# Patient Record
Sex: FEMALE | Race: WHITE | URBAN - METROPOLITAN AREA
[De-identification: names, ages, dates, MRNs, and addresses within clinical notes are randomized per-mention and may not be internally consistent; named-entity substitution may affect disease eponyms.]

---

## 2014-08-13 LAB — GLUCOSE SERPL-MCNC: 96 MG/DL (ref 74–100)

## 2017-02-01 ENCOUNTER — OFFICE VISIT (OUTPATIENT)
Dept: FAMILY MEDICINE | Facility: CLINIC | Age: 42
End: 2017-02-01
Payer: COMMERCIAL

## 2017-02-01 VITALS
WEIGHT: 214 LBS | HEIGHT: 64 IN | BODY MASS INDEX: 36.54 KG/M2 | TEMPERATURE: 97.8 F | DIASTOLIC BLOOD PRESSURE: 72 MMHG | HEART RATE: 74 BPM | SYSTOLIC BLOOD PRESSURE: 132 MMHG

## 2017-02-01 DIAGNOSIS — R21 RASH: ICD-10-CM

## 2017-02-01 DIAGNOSIS — M77.8 RIGHT ELBOW TENDINITIS: Primary | ICD-10-CM

## 2017-02-01 DIAGNOSIS — B36.0 TINEA VERSICOLOR: ICD-10-CM

## 2017-02-01 LAB
KOH PREP SPEC: ABNORMAL
MICRO REPORT STATUS: ABNORMAL
SPECIMEN SOURCE: ABNORMAL

## 2017-02-01 PROCEDURE — 99214 OFFICE O/P EST MOD 30 MIN: CPT | Performed by: FAMILY MEDICINE

## 2017-02-01 PROCEDURE — 87210 SMEAR WET MOUNT SALINE/INK: CPT | Performed by: FAMILY MEDICINE

## 2017-02-01 RX ORDER — FLUCONAZOLE 100 MG/1
TABLET ORAL
Qty: 6 TABLET | Refills: 0 | Status: SHIPPED | OUTPATIENT
Start: 2017-02-01 | End: 2017-03-13

## 2017-02-01 NOTE — PROGRESS NOTES
SUBJECTIVE:                                                    Johnna Severson is a 41 year old female who presents to clinic today for the following health issues:      Joint Pain     Onset: ongoing, started working out more, fell on it about one month ago    Description:   Location: elbow, right  Character: sharper when she uses it, if unused its just sore    Intensity: moderate    Progression of Symptoms: worse    Accompanying Signs & Symptoms:  Other symptoms: radiation of pain to hand, numbness   History:   Previous similar pain: no       Precipitating factors:   Trauma or overuse: no     Alleviating factors:  Improved by: nothing       Therapies Tried and outcome: nothing      Diet/ Exercise/ weight loss  Dropped 36 lbs since December    Joined the [employer] - goes 5 days a week    Likes to do yoga, does elliptical bike  Intermittent fasting   Eats breakfast and dinner  On Keto diet    Eating 10 percent carbs    Will get hungry at dinner times and eat a salad      Hands less swollen    Joints less painful    Lost 20lbs in the first 3.5 weeks     Patient hoping to get off progesterone    Having a period a week early     Sleep / Mood  Still having a hard time sleeping, afraid to wake up sick again    Able to fall asleep at 2 am, and wakes up at 9 am       Takes thyroid pill an hour after she gets out of bed  13 mcg  Sustained t3 at 7 pm    Lateral Epicondylitis   Right elbow hurts    Tender    Feels like it is tendonitis    Doing extremely light weights - 3lbs  Body pump class and yoga may have triggered it    Uses tape gun when she's packing things at work - may be trigger    Rash     Onset: a little over one week    Description:   Location: neck, has gone tanning a couple times preparing for a trip  Character: blotchy, raised  Itching (Pruritis): no     Progression of Symptoms:  Same, a little improved    Accompanying Signs & Symptoms:  Fever: no   Body aches or joint pain: no   Sore throat symptoms: no   Recent  cold symptoms: no    History:   Previous similar rash: no     Precipitating factors:   Exposure to similar rash: no   New exposures: None and tanning   Recent travel: no     Alleviating factors:  nothing     Therapies Tried and outcome: lotion     Neck Rash  12 years ago had Tinea Versicolar on upper right back, relieved by oral antifungal in less than a week  Patient has been tanning    Wondering if this new recent rash is related to sun sensitivity or to her keto diet    Traveling in three week to [place] and wants it resolved by then      Problem list and histories reviewed & adjusted, as indicated.  Additional history: as documented    Patient Active Problem List   Diagnosis     Depression, major     Migraine headache     CHEYENNE (generalized anxiety disorder)     CARDIOVASCULAR SCREENING; LDL GOAL LESS THAN 160     Intermittent asthma     Vitamin D deficiency     Elevated LFTs     Hypertension goal BP (blood pressure) < 140/90     Obesity     Encounter for IUD insertion     Zinc deficiency     IBS (irritable bowel syndrome)     Lactose intolerance     Nonallergic rhinitis     Diagnostic skin and sensitization tests     Keratosis pilaris     Other specified hypothyroidism     Fibromyalgia     Migraine with aura and without status migrainosus, not intractable     overweight with BMI >40     Past Surgical History   Procedure Laterality Date     Cryotherapy, cervical  2002       Social History   Substance Use Topics     Smoking status: Former Smoker -- 0.50 packs/day for 10 years     Types: Cigarettes     Quit date: 08/18/2002     Smokeless tobacco: Former User     Alcohol Use: No     Family History   Problem Relation Age of Onset     DIABETES Maternal Grandfather      CANCER Maternal Grandfather      brain     Cardiovascular Maternal Grandmother      carotid stenosis, CHF     Hypertension Maternal Grandmother      Lipids Brother      Allergies Brother      Hypertension Mother      Allergies Mother      Arthritis  Mother      Other - See Comments Mother      Mangioma removed Sept 2015     DIABETES Father      C.A.D. Father      CABG, stents, 52     HEART DISEASE Father      Alcohol/Drug Paternal Grandfather      Thyroid Disease Maternal Aunt      CANCER Maternal Aunt      ov     CANCER       maternal cousin     CANCER Other 41     uterine          Current Outpatient Prescriptions   Medication Sig Dispense Refill     hydrochlorothiazide (HYDRODIURIL) 25 MG tablet Take 1 tablet (25 mg) by mouth as needed 90 tablet 3     SUMAtriptan (IMITREX) 100 MG tablet Take 1 tablet (100 mg) by mouth at onset of headache for migraine May repeat in 2 hours if needed: max 2/day; 9 tablet 11     albuterol (PROAIR HFA/PROVENTIL HFA/VENTOLIN HFA) 108 (90 BASE) MCG/ACT Inhaler Inhale 2 puffs into the lungs every 6 hours as needed for shortness of breath / dyspnea Profile Rx: patient will contact pharmacy when needed 18 g 3     cyclobenzaprine (FLEXERIL) 10 MG tablet Take 1 tablet (10 mg) by mouth 3 times daily as needed for muscle spasms 90 tablet 1     atenolol (TENORMIN) 25 MG tablet Take 1 tablet (25 mg) by mouth daily 90 tablet 3     UNABLE TO FIND MEDICATION NAME: T4 95 MCG/T3 12.9 MCG capsule - take one capsule by mouth in the morning on empty stomach 1 each 0     ALPRAZolam (ALPRAZOLAM XR) 1 MG 24 hr tablet Take 1 tablet (1 mg) by mouth 2 times daily Prescribed by psychiatry 1 tablet 0     Progesterone Micronized (PROGESTERONE PO) Take 200 mg by mouth Take one capsule by mouth at bedtime on days 10-28 of cycle. Stop if period starts.       UNABLE TO FIND MEDICATION NAME: Triiodo-L-Thyroninr (T3) SR 5 mcg capsule - take one capsule by mouth in earlyafternoon       order for DME Magnesium chloride 12.5% solution.  Take 2-4 tablespoons daily.  Mix in juice or water before taking.  Keep in refrigerator. 960 mL 6     cholecalciferol (VITAMIN D3) 5000 UNITS CAPS capsule Take by mouth daily       mometasone (NASONEX) 50 MCG/ACT nasal spray Spray 2  "sprays into both nostrils daily.       Cod Liver Oil 1250-135 UNITS CAPS Take  by mouth.       B Complex Vitamins (B COMPLEX PO) Take  by mouth.       Probiotic Product (PROBIOTIC PO) Take  by mouth.       [DISCONTINUED] ORDER FOR DME Magnesium chloride 12.5% solution.  Take 2-4 tablespoons daily.  Mix in juice or water before taking.  Keep in refrigerator. 480 mL 6     Allergies   Allergen Reactions     Estrogens [Conjugated Estrogens]      Migraine headaches     Milk Protein GI Disturbance     IgE to milk NEGATIVE in 11/12 per [facility]      No Clinical Screening - See Comments      MRI contrast     Gluten Fatigue, Rash and GI Disturbance     5/11 celiac labs NEGATIVE     Problem list, Medication list, Allergies, and Medical/Social/Surgical histories reviewed in [facility] and updated as appropriate.    ROS:  Constitutional, neuro, ENT, endocrine, pulmonary, cardiac, gastrointestinal, genitourinary, musculoskeletal, integument and psychiatric systems are negative, except as otherwise noted.    This document serves as a record of the services and decisions personally performed and made by Fiona Black. It was created on her behalf by Nereyda Whittaker, a trained medical scribe. The creation of this document is based the provider's statements to the medical scribe.  Nereyda Whittaker February 1, 2017 10:10 AM      OBJECTIVE:                                                    /72 mmHg  Pulse 74  Temp(Src) 97.8  F (36.6  C) (Oral)  Ht 5' 4\" (1.626 m)  Wt 214 lb (97.07 kg)  BMI 36.72 kg/m2  LMP 02/01/2017  Body mass index is 36.72 kg/(m^2).  GENERAL APPEARANCE: healthy, alert, no distress and overweight  ELBOWS: Tender over lateral epicondyle right side(s).  Resisted external rotation and extension of the wrist and fingers reproduces pain.  SKIN: Mildly erythematous, hyperpigmented patches at the base of the neck and into hairline, mild scales  PSYCH: mentation appears normal and affect " normal/bright    Diagnostic Test Results:  Results for orders placed or performed in visit on 02/01/17 (from the past 24 hour(s))   KOH prep (Other than skin, nails, hair)   Result Value Ref Range    Specimen Description Skin     KOH Prep Fungal elements seen (A)     Micro Report Status FINAL 02/01/2017           ASSESSMENT/PLAN:                                                      Annita was seen today for derm problem, other, elbow pain and refill request.    Diagnoses and all orders for this visit:    Right elbow tendinitis   - see patient instructions, will start home exercises   - if no improvement can consider physical therapy and/or ortho    Tinea versicolor  -     fluconazole (DIFLUCAN) 100 MG tablet; 3 tablets po once a week for 2 weeks  -  Common side effects of medications prescribed at this visit were discussed with the patient. Severe side effects, including current applicable black box warnings, were discussed. We discussed options for dealing with these possible side effects and allergic reactions, based on their severity.  -  due to it being in the hairline will use oral instead of topical    Rash  -     KOH prep (Other than skin, nails, hair)  -  due to tinea       Follow up with Provider - PRN   Patient Instructions   Best treatment right now for tinea:  Fluconazole 300 mg once a week for 2 weeks.    Keep up with exercises to relieve and resolve tennis elbow      The information in this document, created by the medical scribe, Nereyda Whittaker, for me, accurately reflects the services I personally performed and the decisions made by me. I have reviewed and approved this document for accuracy prior to leaving the patient care area.  Fiona Black, February 1, 2017 10:11 AM      Fiona Black MD  [facility]

## 2017-02-01 NOTE — NURSING NOTE
"Chief Complaint   Patient presents with     Derm Problem     rash on neck for 1 week     Other     due for health maintenance     Elbow Pain     right     Refill Request     she would like refills if needed since she is here, unsure of what she needed       Initial /72 mmHg  Pulse 74  Temp(Src) 97.8  F (36.6  C) (Oral)  Ht 5' 4\" (1.626 m)  Wt 214 lb (97.07 kg)  BMI 36.72 kg/m2  LMP 02/01/2017 Estimated body mass index is 36.72 kg/(m^2) as calculated from the following:    Height as of this encounter: 5' 4\" (1.626 m).    Weight as of this encounter: 214 lb (97.07 kg).  BP completed using cuff size: lev Collier MA      "

## 2017-02-01 NOTE — MR AVS SNAPSHOT
After Visit Summary   2/1/2017    Johnna Severson    MRN: [id number]           Patient Information     Date Of Birth          1975        Visit Information        Provider Department      2/1/2017 10:00 AM Fiona Black MD [facility]        Today's Diagnoses     Right elbow tendinitis    -  1     Tinea versicolor         Rash           Care Instructions    Best treatment right now for tinea:  Fluconazole 300 mg once a week for 2 weeks.    Keep up with exercises to relieve and resolve tennis elbow                 Lateral Epicondylitis (Tennis Elbow)   Rehabilitation Exercises   You may do the stretching exercises right away. You may do the strengthening exercises when stretching is nearly painless.   Stretching exercises     Wrist range of motion: Bend your wrist forward and backward as far as you can. Do 3 sets of 10.     Pronation and supination of the forearm: With your elbow bent 90 , turn your palm upward and hold for 5 seconds. Slowly turn your palm downward and hold for 5 seconds. Make sure you keep your elbow at your side and bent 90  throughout this exercise. Do 3 sets of 10.     Elbow range of motion: Gently bring your palm up toward your shoulder and bend your elbow as far as you can. Then straighten your elbow as far as you can 10 times. Do 3 sets of 10.   Strengthening exercises     Wrist flexion exercise: Hold a can or hammer handle in your hand with your palm facing up. Bend your wrist upward. Slowly lower the weight and return to the starting position. Do 3 sets of 10. Gradually increase the weight of the can or weight you are holding.     Wrist extension exercise: Hold a soup can or hammer handle in your hand with your palm facing down. Slowly bend your wrist upward. Slowly lower the weight down into the starting position. Do 3 sets of 10. Gradually increase the weight of the object you are holding.     Wrist radial deviation strengthening: Put your wrist  in the sideways position with your thumb up. Hold a can of soup or a hammer handle and gently bend your wrist up, with the thumb reaching toward the ceiling. Slowly lower to the starting position. Do not move your forearm throughout this exercise. Do 3 sets of 10.     Forearm pronation and supination strengthening: Hold a soup can or hammer handle in your hand and bend your elbow 90 . Slowly rotate your hand with your palm upward and then palm down. Do 3 sets of 10.     Wrist extension (with broom handle): Stand up and hold a broom handle in both hands. With your arms at shoulder level, elbows straight and palms down, roll the broom handle backward in your hand as if you are reeling something in using a broom handle. Do 3 sets of 10.   Written by Lidia Johnson, MS, PT, for [employer]   Published by [employer]   This content is reviewed periodically and is subject to change as new health information becomes available. The information is intended to inform and educate and is not a replacement for medical evaluation, advice, diagnosis or treatment by a healthcare professional.   Sports Medicine Advisor 2003.1 Index  Sports Medicine Advisor 2003.1 Credits   Copyright   2003 [employer] All rights reserved.                    Follow-ups after your visit        Who to contact     If you have questions or need follow up information about today's clinic visit or your schedule please contact [facility] directly at [phone].  Normal or non-critical lab and imaging results will be communicated to you by MyChart, letter or phone within 4 business days after the clinic has received the results. If you do not hear from us within 7 days, please contact the clinic through MyChart or phone. If you have a critical or abnormal lab result, we will notify you by phone as soon as possible.  Submit refill requests through [employer] or call your pharmacy and they  "will forward the refill request to us. Please allow 3 business days for your refill to be completed.          Additional Information About Your Visit        [employer] Information     [email or web address] gives you secure access to your electronic health record. If you see a primary care provider, you can also send messages to your care team and make appointments. If you have questions, please call your primary care clinic.  If you do not have a primary care provider, please call [phone] and they will assist you.        Care EveryWhere ID     This is your Care EveryWhere ID. This could be used by other organizations to access your [place] medical records  [id number]        Your Vitals Were     Pulse Temperature Height BMI (Body Mass Index) Last Period       74 97.8  F (36.6  C) (Oral) 5' 4\" (1.626 m) 36.72 kg/m2 02/01/2017        Blood Pressure from Last 3 Encounters:   02/01/17 132/72   12/16/16 130/70   11/03/16 146/84    Weight from Last 3 Encounters:   02/01/17 214 lb (97.07 kg)   12/16/16 233 lb 6.4 oz (105.87 kg)   11/03/16 241 lb 12.8 oz (109.68 kg)              We Performed the Following     KOH prep (Other than skin, nails, hair)          Today's Medication Changes          These changes are accurate as of: 2/1/17 11:03 AM.  If you have any questions, ask your nurse or doctor.               Start taking these medicines.        Dose/Directions    fluconazole 100 MG tablet   Commonly known as:  DIFLUCAN   Used for:  Tinea versicolor   Started by:  Fiona Black MD        3 tablets po once a week for 2 weeks   Quantity:  6 tablet   Refills:  0            Where to get your medicines      These medications were sent to [id number], [address] [place] [address] [facility] [address], [facility] [address]     Phone:  [phone]    - fluconazole 100 MG tablet             Primary Care Provider Office Phone # Fax #    Fiona Black [phone] "       [facility] [address] [facility] [address]        Thank you!     Thank you for choosing [facility]  for your care. Our goal is always to provide you with excellent care. Hearing back from our patients is one way we can continue to improve our services. Please take a few minutes to complete the written survey that you may receive in the mail after your visit with us. Thank you!             Your Updated Medication List - Protect others around you: Learn how to safely use, store and throw away your medicines at www.disposemymeds.org.          This list is accurate as of: 2/1/17 11:03 AM.  Always use your most recent med list.                   Brand Name Dispense Instructions for use    albuterol 108 (90 BASE) MCG/ACT Inhaler    PROAIR HFA/PROVENTIL HFA/VENTOLIN HFA    18 g    Inhale 2 puffs into the lungs every 6 hours as needed for shortness of breath / dyspnea Profile Rx: patient will contact pharmacy when needed       ALPRAZOLAM XR 1 MG 24 hr tablet   Generic drug:  ALPRAZolam     1 tablet    Take 1 tablet (1 mg) by mouth 2 times daily Prescribed by psychiatry       atenolol 25 MG tablet    TENORMIN    90 tablet    Take 1 tablet (25 mg) by mouth daily       B COMPLEX PO      Take  by mouth.       cholecalciferol 5000 UNITS Caps capsule    vitamin D3     Take by mouth daily       Cod Liver Oil 1250-135 UNITS Caps      Take  by mouth.       cyclobenzaprine 10 MG tablet    FLEXERIL    90 tablet    Take 1 tablet (10 mg) by mouth 3 times daily as needed for muscle spasms       fluconazole 100 MG tablet    DIFLUCAN    6 tablet    3 tablets po once a week for 2 weeks       hydrochlorothiazide 25 MG tablet    HYDRODIURIL    90 tablet    Take 1 tablet (25 mg) by mouth as needed       NASONEX 50 MCG/ACT spray   Generic drug:  mometasone      Spray 2 sprays into both nostrils daily.       order for DME     960 mL    Magnesium chloride 12.5% solution.  Take 2-4 tablespoons  daily.  Mix in juice or water before taking.  Keep in refrigerator.       PROBIOTIC PO      Take  by mouth.       PROGESTERONE PO      Take 200 mg by mouth Take one capsule by mouth at bedtime on days 10-28 of cycle. Stop if period starts.       SUMAtriptan 100 MG tablet    IMITREX    9 tablet    Take 1 tablet (100 mg) by mouth at onset of headache for migraine May repeat in 2 hours if needed: max 2/day;       * UNABLE TO FIND      MEDICATION NAME: Triiodo-L-Thyroninr (T3) SR 5 mcg capsule - take one capsule by mouth in earlyafternoon       * UNABLE TO FIND     1 each    MEDICATION NAME: T4 95 MCG/T3 12.9 MCG capsule - take one capsule by mouth in the morning on empty stomach       * Notice:  This list has 2 medication(s) that are the same as other medications prescribed for you. Read the directions carefully, and ask your doctor or other care provider to review them with you.

## 2017-02-01 NOTE — PATIENT INSTRUCTIONS
Best treatment right now for tinea:  Fluconazole 300 mg once a week for 2 weeks.    Keep up with exercises to relieve and resolve tennis elbow                 Lateral Epicondylitis (Tennis Elbow)   Rehabilitation Exercises   You may do the stretching exercises right away. You may do the strengthening exercises when stretching is nearly painless.   Stretching exercises     Wrist range of motion: Bend your wrist forward and backward as far as you can. Do 3 sets of 10.     Pronation and supination of the forearm: With your elbow bent 90 , turn your palm upward and hold for 5 seconds. Slowly turn your palm downward and hold for 5 seconds. Make sure you keep your elbow at your side and bent 90  throughout this exercise. Do 3 sets of 10.     Elbow range of motion: Gently bring your palm up toward your shoulder and bend your elbow as far as you can. Then straighten your elbow as far as you can 10 times. Do 3 sets of 10.   Strengthening exercises     Wrist flexion exercise: Hold a can or hammer handle in your hand with your palm facing up. Bend your wrist upward. Slowly lower the weight and return to the starting position. Do 3 sets of 10. Gradually increase the weight of the can or weight you are holding.     Wrist extension exercise: Hold a soup can or hammer handle in your hand with your palm facing down. Slowly bend your wrist upward. Slowly lower the weight down into the starting position. Do 3 sets of 10. Gradually increase the weight of the object you are holding.     Wrist radial deviation strengthening: Put your wrist in the sideways position with your thumb up. Hold a can of soup or a hammer handle and gently bend your wrist up, with the thumb reaching toward the ceiling. Slowly lower to the starting position. Do not move your forearm throughout this exercise. Do 3 sets of 10.     Forearm pronation and supination strengthening: Hold a soup can or hammer handle in your hand and bend your elbow 90 . Slowly rotate  your hand with your palm upward and then palm down. Do 3 sets of 10.     Wrist extension (with broom handle): Stand up and hold a broom handle in both hands. With your arms at shoulder level, elbows straight and palms down, roll the broom handle backward in your hand as if you are reeling something in using a broom handle. Do 3 sets of 10.   Written by Lidia Johnson, MS, PT, for [employer].   Published by [employer].   This content is reviewed periodically and is subject to change as new health information becomes available. The information is intended to inform and educate and is not a replacement for medical evaluation, advice, diagnosis or treatment by a healthcare professional.   Sports Medicine Advisor 2003.1 Index  Sports Medicine Advisor 2003.1 Credits   Copyright   2003 [employer]. All rights reserved.

## 2017-02-02 ASSESSMENT — ASTHMA QUESTIONNAIRES: ACT_TOTALSCORE: 22

## 2017-03-13 ENCOUNTER — OFFICE VISIT (OUTPATIENT)
Dept: FAMILY MEDICINE | Facility: CLINIC | Age: 42
End: 2017-03-13
Payer: COMMERCIAL

## 2017-03-13 VITALS
BODY MASS INDEX: 34.17 KG/M2 | HEIGHT: 64 IN | TEMPERATURE: 97.5 F | WEIGHT: 200.13 LBS | HEART RATE: 66 BPM | SYSTOLIC BLOOD PRESSURE: 124 MMHG | DIASTOLIC BLOOD PRESSURE: 74 MMHG

## 2017-03-13 DIAGNOSIS — M77.11 LATERAL EPICONDYLITIS OF RIGHT ELBOW: Primary | ICD-10-CM

## 2017-03-13 DIAGNOSIS — J45.20 INTERMITTENT ASTHMA, UNCOMPLICATED: ICD-10-CM

## 2017-03-13 PROCEDURE — 99213 OFFICE O/P EST LOW 20 MIN: CPT | Performed by: PHYSICIAN ASSISTANT

## 2017-03-13 ASSESSMENT — ANXIETY QUESTIONNAIRES
6. BECOMING EASILY ANNOYED OR IRRITABLE: NOT AT ALL
5. BEING SO RESTLESS THAT IT IS HARD TO SIT STILL: NOT AT ALL
2. NOT BEING ABLE TO STOP OR CONTROL WORRYING: SEVERAL DAYS
GAD7 TOTAL SCORE: 2
3. WORRYING TOO MUCH ABOUT DIFFERENT THINGS: SEVERAL DAYS
IF YOU CHECKED OFF ANY PROBLEMS ON THIS QUESTIONNAIRE, HOW DIFFICULT HAVE THESE PROBLEMS MADE IT FOR YOU TO DO YOUR WORK, TAKE CARE OF THINGS AT HOME, OR GET ALONG WITH OTHER PEOPLE: NOT DIFFICULT AT ALL
1. FEELING NERVOUS, ANXIOUS, OR ON EDGE: NOT AT ALL
7. FEELING AFRAID AS IF SOMETHING AWFUL MIGHT HAPPEN: NOT AT ALL

## 2017-03-13 ASSESSMENT — PATIENT HEALTH QUESTIONNAIRE - PHQ9: 5. POOR APPETITE OR OVEREATING: NOT AT ALL

## 2017-03-13 NOTE — PATIENT INSTRUCTIONS
[facility]   Discharged by : Reena YE MA    If you have any questions regarding your visit please contact your care team:     Team Gold Clinic Hours Telephone Number   Dr. Gemini Hodge PA-C   7am-7pm Monday - Thursday   7am-5pm Fridays  [phone]   (Appointment scheduling available 24/7)   RN Line   [phone] option 2       For a Price Quote for your services, please call our Consumer Price Line at [phone].     What options do I have for visits at the clinic other than the traditional office visit?     To expand how we care for you, many of our providers are utilizing electronic visits (e-visits) and telephone visits, when medically appropriate, for interactions with their patients rather than a visit in the clinic. We also offer nurse visits for many medical concerns. Just like any other service, we will bill your insurance company for this type of visit based on time spent on the phone with your provider. Not all insurance companies cover these visits. Please check with your medical insurance if this type of visit is covered. You will be responsible for any charges that are not paid by your insurance.   E-visits via LocalBonus: generally incur a $35.00 fee.     Telephone visits:   Time spent on the phone: *charged based on time that is spent on the phone in increments of 10 minutes. Estimated cost:   5-10 mins $30.00   11-20 mins. $59.00   21-30 mins. $85.00     Use FRAMEDhart (secure email communication and access to your chart) to send your primary care provider a message or make an appointment. Ask someone on your Team how to sign up for [employer].     As always, Thank you for trusting us with your health care needs!      [facility]:    Scheduling Appointments  [facility] [place]  Call: [phone]    [facility]abel Elkins [facility]  Call: [phone]    [facility]  Locations  Call: [phone]      WHERE TO GO FOR CARE?    Clinic    Make an appointment if you:       Are sick (cold, cough, flu, sore throat, earache or in pain).       Have a small injury (sprain, small cut, burn or broken bone).       Need a physical exam, Pap smear, vaccine or prescription refill.       Have questions about your health or medicines.    To reach us:      Call [phone] ([phone]). Open 24 hours every day. (For counseling services, call [phone].)    Log into [employer] at [email or web address][employer]. (Visit ReplyBuy to create an account.) Hospital emergency room    An emergency is a serious or life- threatening problem that must be treated right away.    Call [phone] or get to the hospital if you have:      Very bad or sudden:            - Chest pain or pressure         - Bleeding         - Head or belly pain         - Dizziness or trouble seeing, walking or                          Speaking      Problems breathing      Blood in your vomit or you are coughing up blood      A major injury (knocked out, loss of a finger or limb, rape, broken bone protruding from skin)    A mental health crisis. (Or call the Mental Health Crisis line at 1-179.560.9713 or Suicide Prevention Hotline at 1-526.491.8024.)    Open 24 hours every day. You don't need an appointment.     Urgent care    Visit urgent care for sickness or small injuries when the clinic is closed. You don't need an appointment. To check hours or find an urgent care near you, visit [email or web address]. Online care    Get online care from [employer] for more than 70 common problems, like colds, allergies and infections. Open 24 hours every day at: [email or web address]   Need help deciding?    For advice about where to be seen, you may call your clinic and ask to speak with a nurse. We're here for you 24 hours every day.         If you are deaf or hard of hearing, please let us know. We provide many free services including  sign language interpreters, oral interpreters, TTYs, telephone amplifiers, note takers and written materials.

## 2017-03-13 NOTE — NURSING NOTE
"Chief Complaint   Patient presents with     Musculoskeletal Problem     tennis elbow       Initial /74 (BP Location: Right arm, Cuff Size: Adult Regular)  Pulse 66  Temp 97.5  F (36.4  C) (Oral)  Ht 5' 4\" (1.626 m)  Wt 200 lb 2 oz (90.8 kg)  BMI 34.35 kg/m2 Estimated body mass index is 34.35 kg/(m^2) as calculated from the following:    Height as of this encounter: 5' 4\" (1.626 m).    Weight as of this encounter: 200 lb 2 oz (90.8 kg).  Medication Reconciliation: complete     Gemini MEDINA, Certified Medical Assistant (AAMA)March 13, 2017 2:56 PM      "

## 2017-03-13 NOTE — PROGRESS NOTES
"  SUBJECTIVE:                                                    Johnna Severson is a 41 year old female who presents to clinic today for the following health issues:        Asthma Follow-Up    Was ACT completed today?    Yes    ACT Total Scores 3/13/2017   ACT TOTAL SCORE (Goal Greater than or Equal to 20) 25   In the past 12 months, how many times did you visit the emergency room for your asthma without being admitted to the hospital? 0   In the past 12 months, how many times were you hospitalized overnight because of your asthma? 0       Recent asthma triggers that patient is dealing with: pollens        Amount of exercise or physical activity: 5 days/week for an average of greater than 60 minutes    Problems taking medications regularly: No    Medication side effects: none  Diet: Wade diet    Joint Pain - Right elbow     Onset: 1 month    Description:   Location: right elbow  Character: feels like tendonitis    Intensity: moderate    Progression of Symptoms: worse    Accompanying Signs & Symptoms:  Other symptoms: radiation of pain down right arm, tingling and weakness of right arm   History:   Previous similar pain: no       Precipitating factors:   Trauma or overuse: no     Alleviating factors:  Improved by: ice numbs it a little       Therapies Tried and outcome: PT at home and has brace but not helping anymore.        -------------------------------------    Problem list, Medication list, Allergies, and Medical/Social/Surgical histories reviewed in [facility] and updated as appropriate.    ROS:  A pertinent ROS of the General  Cardiovascular  Pulmonary  Musculoskeletal   Neurological  Integumentary systems is otherwise unremarkable.      OBJECTIVE:                                                    /74 (BP Location: Right arm, Cuff Size: Adult Regular)  Pulse 66  Temp 97.5  F (36.4  C) (Oral)  Ht 5' 4\" (1.626 m)  Wt 200 lb 2 oz (90.8 kg)  BMI 34.35 kg/m2 .  GENERAL: healthy, alert and no distress  Elbow " Exam: Inspection: swelling lateral epicondyle  Tender: lateral epicondyle and common extensor tendon  Non-tender: medial epicondyle, common flexor tendon, flexor muscle of forearm and olecranon bursa  Range of Motion: flexion: normal, extension: normal, supination:  Normal with pain, pronation: normal  Strength: flexion: 5/5, extension: 5/5, supination: 4+/5, painful and pronation: 5/5  Special tests: pain with resisted supination:       Diagnostic test results:  Diagnostic Test Results:  none      ASSESSMENT/PLAN:                                                        1. Lateral epicondylitis of right elbow  Discussed possible injection with ortho. Referral given.   - ORTHO  REFERRAL    2. Intermittent asthma, uncomplicated  Stable. No concerns.          Ayde Hodge PA-C  [employer]     Chart reviewed.  Encounter was not reviewed with provider.  Patient was not examined by me.  Fiona Black MD

## 2017-03-13 NOTE — MR AVS SNAPSHOT
After Visit Summary   3/13/2017    Johnna Severson    MRN: [id number]           Patient Information     Date Of Birth          1975        Visit Information        Provider Department      3/13/2017 2:40 PM Ayde Hodge PA-C [facility]        Today's Diagnoses     Lateral epicondylitis of right elbow    -  1      Care Instructions    [facility]   Discharged by : Reena YE MA    If you have any questions regarding your visit please contact your care team:     Team Gold Clinic Hours Telephone Number   Dr. Gemini Hodge PA-C   7am-7pm Monday - Thursday   7am-5pm Fridays  [phone]   (Appointment scheduling available 24/7)   RN Line   [phone] option 2       For a Price Quote for your services, please call our Consumer Price Line at [phone].     What options do I have for visits at the clinic other than the traditional office visit?     To expand how we care for you, many of our providers are utilizing electronic visits (e-visits) and telephone visits, when medically appropriate, for interactions with their patients rather than a visit in the clinic. We also offer nurse visits for many medical concerns. Just like any other service, we will bill your insurance company for this type of visit based on time spent on the phone with your provider. Not all insurance companies cover these visits. Please check with your medical insurance if this type of visit is covered. You will be responsible for any charges that are not paid by your insurance.   E-visits via BigML: generally incur a $35.00 fee.     Telephone visits:   Time spent on the phone: *charged based on time that is spent on the phone in increments of 10 minutes. Estimated cost:   5-10 mins $30.00   11-20 mins. $59.00   21-30 mins. $85.00     Use BigML (secure email communication and access to your chart) to send your primary care  provider a message or make an appointment. Ask someone on your Team how to sign up for Szl.     As always, Thank you for trusting us with your health care needs!      [facility]:    Scheduling Appointments  [facility]  Call: [phone]    [facility]  Call: [phone]    [facility]  Call: [phone]      WHERE TO GO FOR CARE?    Clinic    Make an appointment if you:       Are sick (cold, cough, flu, sore throat, earache or in pain).       Have a small injury (sprain, small cut, burn or broken bone).       Need a physical exam, Pap smear, vaccine or prescription refill.       Have questions about your health or medicines.    To reach us:      Call [phone] ([phone]). Open 24 hours every day. (For counseling services, call [phone].)    Log into Szl at [employer]. (Visit [employer].Pending sale to [facility] to create an account.) Hospital emergency room    An emergency is a serious or life- threatening problem that must be treated right away.    Call 173 or get to the hospital if you have:      Very bad or sudden:            - Chest pain or pressure         - Bleeding         - Head or belly pain         - Dizziness or trouble seeing, walking or                          Speaking      Problems breathing      Blood in your vomit or you are coughing up blood      A major injury (knocked out, loss of a finger or limb, rape, broken bone protruding from skin)    A mental health crisis. (Or call the Mental Health Crisis line at 1-907.442.4649 or Suicide Prevention Hotline at 1-581.404.3671.)    Open 24 hours every day. You don't need an appointment.     Urgent care    Visit urgent care for sickness or small injuries when the clinic is closed. You don't need an appointment. To check hours or find an urgent care near you, visit www.Pending sale to [email or web address]. Online care    Get online care from [facility] for more  than 70 common problems, like colds, allergies and infections. Open 24 hours every day at: [email or web address]   Need help deciding?    For advice about where to be seen, you may call your clinic and ask to speak with a nurse. We're here for you 24 hours every day.         If you are deaf or hard of hearing, please let us know. We provide many free services including sign language interpreters, oral interpreters, TTYs, telephone amplifiers, note takers and written materials.                       Follow-ups after your visit        Additional Services     ORTHO  REFERRAL       [facility] is referring you to the Orthopedic  Services at [facility].       The  Representative will assist you in the coordination of your Orthopedic and Musculoskeletal Care as prescribed by your physician.    The  Representative will call you within 1 business day to help schedule your appointment, or you may contact the  Representative at:    All areas ~ [phone]     Type of Referral : Non Surgical       Timeframe requested: 1 - 2 days    Coverage of these services is subject to the terms and limitations of your health insurance plan.  Please call member services at your health plan with any benefit or coverage questions.      If X-rays, CT or MRI's have been performed, please contact the facility where they were done to arrange for , prior to your scheduled appointment.  Please bring this referral request to your appointment and present it to your specialist.                  Who to contact     If you have questions or need follow up information about today's clinic visit or your schedule please contact [facility] directly at [phone].  Normal or non-critical lab and imaging results will be communicated to you by MyChart, letter or phone within 4 business days after the clinic has received the results. If you do  "not hear from us within 7 days, please contact the clinic through [employer] (formerly [employer]) or phone. If you have a critical or abnormal lab result, we will notify you by phone as soon as possible.  Submit refill requests through [employer] ([employer]) or call your pharmacy and they will forward the refill request to us. Please allow 3 business days for your refill to be completed.          Additional Information About Your Visit        [employer] Information     [employer] ([employer]) gives you secure access to your electronic health record. If you see a primary care provider, you can also send messages to your care team and make appointments. If you have questions, please call your primary care clinic.  If you do not have a primary care provider, please call [phone] and they will assist you.        Care EveryWhere ID     This is your Care EveryWhere ID. This could be used by other organizations to access your [place] medical records  [id number]        Your Vitals Were     Pulse Temperature Height BMI (Body Mass Index)          66 97.5  F (36.4  C) (Oral) 5' 4\" (1.626 m) 34.35 kg/m2         Blood Pressure from Last 3 Encounters:   03/13/17 124/74   02/01/17 132/72   12/16/16 130/70    Weight from Last 3 Encounters:   03/13/17 200 lb 2 oz (90.8 kg)   02/01/17 214 lb (97.1 kg)   12/16/16 233 lb 6.4 oz (105.9 kg)              We Performed the Following     ORTHO  REFERRAL        Primary Care Provider Office Phone # Fax #    Fiona Black [phone]       [address] [address]        Thank you!     Thank you for choosing [facility]  for your care. Our goal is always to provide you with excellent care. Hearing back from our patients is one way we can continue to improve our services. Please take a few minutes to complete the written survey that you may receive in the mail after your visit with us. Thank you!             Your Updated Medication List - Protect " others around you: Learn how to safely use, store and throw away your medicines at www.disposemymeds.org.          This list is accurate as of: 3/13/17  3:31 PM.  Always use your most recent med list.                   Brand Name Dispense Instructions for use    albuterol 108 (90 BASE) MCG/ACT Inhaler    PROAIR HFA/PROVENTIL HFA/VENTOLIN HFA    18 g    Inhale 2 puffs into the lungs every 6 hours as needed for shortness of breath / dyspnea Profile Rx: patient will contact pharmacy when needed       ALPRAZOLAM XR 1 MG 24 hr tablet   Generic drug:  ALPRAZolam     1 tablet    Take 1 tablet (1 mg) by mouth 2 times daily Prescribed by psychiatry       atenolol 25 MG tablet    TENORMIN    90 tablet    Take 1 tablet (25 mg) by mouth daily       B COMPLEX PO      Take  by mouth.       cholecalciferol 5000 UNITS Caps capsule    vitamin D3     Take by mouth daily       Cod Liver Oil 1250-135 UNITS Caps      Take  by mouth.       cyclobenzaprine 10 MG tablet    FLEXERIL    90 tablet    Take 1 tablet (10 mg) by mouth 3 times daily as needed for muscle spasms       hydrochlorothiazide 25 MG tablet    HYDRODIURIL    90 tablet    Take 1 tablet (25 mg) by mouth as needed       NASONEX 50 MCG/ACT spray   Generic drug:  mometasone      Spray 2 sprays into both nostrils daily.       order for DME     960 mL    Magnesium chloride 12.5% solution.  Take 2-4 tablespoons daily.  Mix in juice or water before taking.  Keep in refrigerator.       PROBIOTIC PO      Take  by mouth.       PROGESTERONE PO      Take 200 mg by mouth Take one capsule by mouth at bedtime on days 10-28 of cycle. Stop if period starts.       SUMAtriptan 100 MG tablet    IMITREX    9 tablet    Take 1 tablet (100 mg) by mouth at onset of headache for migraine May repeat in 2 hours if needed: max 2/day;       * UNABLE TO FIND      MEDICATION NAME: Triiodo-L-Thyroninr (T3) SR 5 mcg capsule - take one capsule by mouth in earlyafternoon       * UNABLE TO FIND     1 each     MEDICATION NAME: T4 95 MCG/T3 12.9 MCG capsule - take one capsule by mouth in the morning on empty stomach       * Notice:  This list has 2 medication(s) that are the same as other medications prescribed for you. Read the directions carefully, and ask your doctor or other care provider to review them with you.

## 2017-03-14 ENCOUNTER — OFFICE VISIT (OUTPATIENT)
Dept: ORTHOPEDICS | Facility: CLINIC | Age: 42
End: 2017-03-14
Payer: COMMERCIAL

## 2017-03-14 VITALS
SYSTOLIC BLOOD PRESSURE: 126 MMHG | DIASTOLIC BLOOD PRESSURE: 72 MMHG | BODY MASS INDEX: 34.15 KG/M2 | HEIGHT: 64 IN | WEIGHT: 200 LBS

## 2017-03-14 DIAGNOSIS — M77.11 RIGHT LATERAL EPICONDYLITIS: Primary | ICD-10-CM

## 2017-03-14 PROCEDURE — 99243 OFF/OP CNSLTJ NEW/EST LOW 30: CPT | Performed by: PEDIATRICS

## 2017-03-14 ASSESSMENT — ANXIETY QUESTIONNAIRES: GAD7 TOTAL SCORE: 2

## 2017-03-14 ASSESSMENT — ASTHMA QUESTIONNAIRES: ACT_TOTALSCORE: 25

## 2017-03-14 ASSESSMENT — PATIENT HEALTH QUESTIONNAIRE - PHQ9: SUM OF ALL RESPONSES TO PHQ QUESTIONS 1-9: 0

## 2017-03-14 NOTE — PROGRESS NOTES
Sports Medicine Clinic Visit    PCP: Fiona Black    Johnna Severson is a 41 year old female who is seen  in consultation at the request of Ayde Hodge PA-C presenting with right lateral elbow pain. Pain has been present for about 2 months.  No specific injury, but did increase her work outs and added yoga.    Has done some HEP stretching, icing and limited use.   Patient is right hand dominant.    Injury: gradual onset.  + pain at rest, which is typical for past few weeks.  Has tried strap, though was wearing it more over the upper arm and elbow itself. Did not really wear over the proximal forearm.    Uses industrial stapler, tape gun routinely with work.    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    Past Medical History   Diagnosis Date     Benign heart murmur 2006     Chronic fatigue      Depression, major      Diagnostic skin and sensitization tests 6/12/13 IgE tests all NEGATIVE for pollens, molds, dust mites, and pets.      Fibroids 8/7/09     CHEYENNE (generalized anxiety disorder)      Psychiatry, Dr. Navarro     Hypertension      Hypothyroid      hashimotos     IBS (irritable bowel syndrome)      5/11 celiac labs NEGATIVE     Intermittent asthma      cold weather     Keratosis pilaris  "     Lactose intolerance      LGSIL (low grade squamous intraepithelial dysplasia) 2002     Migraine headaches      menstrual trigger, aggravated by OCPs     Nonallergic rhinitis      6/12/13 IgE tests all NEGATIVE for pollens, molds, dust mites, and pets.      Panic attacks      PVC's (premature ventricular contractions) 2006     Vitamin D deficiency      Past Surgical History   Procedure Laterality Date     Cryotherapy, cervical  2002     Family History   Problem Relation Age of Onset     DIABETES Maternal Grandfather      CANCER Maternal Grandfather      brain     Cardiovascular Maternal Grandmother      carotid stenosis, CHF     Hypertension Maternal Grandmother      Lipids Brother      Allergies Brother      Hypertension Mother      Allergies Mother      Arthritis Mother      Other - See Comments Mother      Mangioma removed Sept 2015     DIABETES Father      C.A.D. Father      CABG, stents, 52     HEART DISEASE Father      Alcohol/Drug Paternal Grandfather      Thyroid Disease Maternal Aunt      CANCER Other 41     uterine      CANCER Maternal Aunt      ov     CANCER Other      maternal cousin     Social History     Social History     Marital status: Significant other     Spouse name: Arnold     Number of children: 2     Years of education: 12     Occupational History     [employer], admin [employer]     Social History Main Topics     Smoking status: Former Smoker     Packs/day: 0.50     Years: 10.00     Types: Cigarettes     Quit date: 8/18/2002     Smokeless tobacco: Former User     Alcohol use No     Drug use: No     Sexual activity: Yes     Partners: Male     Birth control/ protection: Condom     Other Topics Concern     Parent/Sibling W/ Cabg, Mi Or Angioplasty Before 65f 55m? No     Social History Narrative       Objective  /72  Ht 5' 4\" (1.626 m)  Wt 200 lb (90.7 kg)  BMI 34.33 kg/m2    GENERAL APPEARANCE: healthy, alert and no distress   GAIT: NORMAL  SKIN: no suspicious lesions or " rashes  NEURO: Normal strength and tone, mentation intact and speech normal  PSYCH:  mentation appears normal and affect normal/bright  HEENT: no scleral icterus  CV: no extremity edema  RESP: nonlabored breathing    Right Elbow exam:    Inspection:       no ecchymosis       no edema or effusion    ROM:       full with flexion, extension, forearm supination and pronation.  Pain with end of extension, forearm rotation.    Strength:       flexion 5/5       extension 4/5 increase in pain       forearm supination 4/5, pain       forearm pronation 5-/5 increase in pain   strength reduced on right, particularly with elbow in an extended position    Tender:       lateral epicondyle, extensor tendon, dorsal forearm wad    Non-Tender:       remainder of elbow area    Sensation:  Intact throughout forearm, hand     Skin:       well perfused       capillary refill brisk    + long finger resisted extension test.         Radiology:  None today.    Assessment:  1. Right lateral epicondylitis        Plan:  Discussed the assessment with the patient. Discussed the etiology of lateral epicondyle tendinosis. This condition is frequently caused by repetitive stress from gripping/grasping activities. Cause of this issue may include a change in level of activity that seems trivial to the patient. Treatment options were discussed with the patient stressing the importance of activity modification to avoid painful activities; in particular, limit palm down lifting and reaching. We discussed rehabilitation to improve the pain and overall function at the elbow; referred to therapy next step. Use of device such as counter force bracing and/or wrist bracing was discussed; she applied forearm strap properly today and noted significant benefit, will continue with its use for activities. Also discussed potential benefit from wrist bracing, hold with that for now. Bracing only temporarily helps the symptoms.  Discussed potential for steroid  injection as well. Injection is not curative, and I would also recommend still doing therapy. Additionally, she has had a reaction with palpitations when receiving local anesthetic injection, so this would be limited with a steroid injection if done. At this time, she will forgo an injection in favor of starting therapy. We may reconsider pending her course.  Follow up: 4 weeks if not improving with above, sooner if needed.  Questions answered. The patient indicates understanding of these issues and agrees with the plan.    Yinka Jimenez, DO, CAQ    CC: Ayde Hodge PA-C          Disclaimer: This note consists of symbols derived from keyboarding, dictation and/or voice recognition software. As a result, there may be errors in the script that have gone undetected. Please consider this when interpreting information found in this chart.

## 2017-03-14 NOTE — MR AVS SNAPSHOT
After Visit Summary   3/14/2017    Johnna Severson    MRN: [id number]           Patient Information     Date Of Birth          1975        Visit Information        Provider Department      3/14/2017 6:00 PM Yinka Jimenez,  [facility]        Today's Diagnoses     Right lateral epicondylitis    -  1       Follow-ups after your visit        Additional Services     [facility] PT, HAND, AND CHIROPRACTIC REFERRAL       **This order will print in the [facility] Scheduling Office**    Physical Therapy, Hand Therapy and Chiropractic Care are available through:    *[facility]  *[facility]  *[facility]    Call one number to schedule at any of the above locations: [phone].    Your provider has referred you to: Physical Therapy at [facility] or [facility]/Hand therapy    Indication/Reason for Referral: Elbow Pain  Onset of Illness:   Therapy Orders: Evaluate and Treat  Special Programs: None  Special Request: None    Mari Guerra      Additional Comments for the Therapist or Chiropractor:     Please be aware that coverage of these services is subject to the terms and limitations of your health insurance plan.  Call member services at your health plan with any benefit or coverage questions.      Please bring the following to your appointment:    *Your personal calendar for scheduling future appointments  *Comfortable clothing                  Who to contact     If you have questions or need follow up information about today's clinic visit or your schedule please contact [facility] [place] directly at [phone].  Normal or non-critical lab and imaging results will be communicated to you by MyChart, letter or phone within 4 business days after the clinic has received the results. If you do not hear from us within 7 days, please contact the clinic through MyChart or phone. If you have a critical or abnormal  "lab result, we will notify you by phone as soon as possible.  Submit refill requests through [employer] or call your pharmacy and they will forward the refill request to us. Please allow 3 business days for your refill to be completed.          Additional Information About Your Visit        [employer] Information     [employer] gives you secure access to your electronic health record. If you see a primary care provider, you can also send messages to your care team and make appointments. If you have questions, please call your primary care clinic.  If you do not have a primary care provider, please call [phone] and they will assist you.        Care EveryWhere ID     This is your Care EveryWhere ID. This could be used by other organizations to access your [place] medical records  [id number]        Your Vitals Were     Height BMI (Body Mass Index)                5' 4\" (1.626 m) 34.33 kg/m2           Blood Pressure from Last 3 Encounters:   03/14/17 126/72   03/13/17 124/74   02/01/17 132/72    Weight from Last 3 Encounters:   03/14/17 200 lb (90.7 kg)   03/13/17 200 lb 2 oz (90.8 kg)   02/01/17 214 lb (97.1 kg)              We Performed the Following     [facility] PT, HAND, AND CHIROPRACTIC REFERRAL        Primary Care Provider Office Phone # Fax #    Fiona Black [phone]       [address] [address]        Thank you!     Thank you for choosing [facility]  for your care. Our goal is always to provide you with excellent care. Hearing back from our patients is one way we can continue to improve our services. Please take a few minutes to complete the written survey that you may receive in the mail after your visit with us. Thank you!             Your Updated Medication List - Protect others around you: Learn how to safely use, store and throw away your medicines at www.disposemymeds.org.          This list is accurate as " of: 3/14/17  6:36 PM.  Always use your most recent med list.                   Brand Name Dispense Instructions for use    albuterol 108 (90 BASE) MCG/ACT Inhaler    PROAIR HFA/PROVENTIL HFA/VENTOLIN HFA    18 g    Inhale 2 puffs into the lungs every 6 hours as needed for shortness of breath / dyspnea Profile Rx: patient will contact pharmacy when needed       ALPRAZOLAM XR 1 MG 24 hr tablet   Generic drug:  ALPRAZolam     1 tablet    Take 1 tablet (1 mg) by mouth 2 times daily Prescribed by psychiatry       atenolol 25 MG tablet    TENORMIN    90 tablet    Take 1 tablet (25 mg) by mouth daily       B COMPLEX PO      Take  by mouth.       cholecalciferol 5000 UNITS Caps capsule    vitamin D3     Take by mouth daily       Cod Liver Oil 1250-135 UNITS Caps      Take  by mouth.       cyclobenzaprine 10 MG tablet    FLEXERIL    90 tablet    Take 1 tablet (10 mg) by mouth 3 times daily as needed for muscle spasms       hydrochlorothiazide 25 MG tablet    HYDRODIURIL    90 tablet    Take 1 tablet (25 mg) by mouth as needed       NASONEX 50 MCG/ACT spray   Generic drug:  mometasone      Spray 2 sprays into both nostrils daily.       order for DME     960 mL    Magnesium chloride 12.5% solution.  Take 2-4 tablespoons daily.  Mix in juice or water before taking.  Keep in refrigerator.       PROBIOTIC PO      Take  by mouth.       PROGESTERONE PO      Take 200 mg by mouth Take one capsule by mouth at bedtime on days 10-28 of cycle. Stop if period starts.       SUMAtriptan 100 MG tablet    IMITREX    9 tablet    Take 1 tablet (100 mg) by mouth at onset of headache for migraine May repeat in 2 hours if needed: max 2/day;       * UNABLE TO FIND      MEDICATION NAME: Triiodo-L-Thyroninr (T3) SR 5 mcg capsule - take one capsule by mouth in earlyafternoon       * UNABLE TO FIND     1 each    MEDICATION NAME: T4 95 MCG/T3 12.9 MCG capsule - take one capsule by mouth in the morning on empty stomach       * Notice:  This list has 2  medication(s) that are the same as other medications prescribed for you. Read the directions carefully, and ask your doctor or other care provider to review them with you.

## 2017-03-14 NOTE — Clinical Note
I had the opportunity to see Johnna Severson in [facility] clinic for her lateral elbow complaints. Following discussion of all options, she elected therapy next; we did discuss injection, will hold for now. Please see chart for details of the visit. Thanks.

## 2017-03-14 NOTE — NURSING NOTE
"Chief Complaint   Patient presents with     Musculoskeletal Problem     right elbow pain       Initial /72  Ht 5' 4\" (1.626 m)  Wt 200 lb (90.7 kg)  BMI 34.33 kg/m2 Estimated body mass index is 34.33 kg/(m^2) as calculated from the following:    Height as of this encounter: 5' 4\" (1.626 m).    Weight as of this encounter: 200 lb (90.7 kg).  Medication Reconciliation: complete  "

## 2017-03-14 NOTE — PATIENT INSTRUCTIONS
Forearm strap as needed  Start with therapy  Activity modification reviewed  Monitor course over next 4 weeks  Discussed potential for injection, pending course with therapy

## 2017-04-05 ENCOUNTER — THERAPY VISIT (OUTPATIENT)
Dept: OCCUPATIONAL THERAPY | Facility: CLINIC | Age: 42
End: 2017-04-05
Payer: COMMERCIAL

## 2017-04-05 DIAGNOSIS — M77.11 LATERAL EPICONDYLITIS OF RIGHT ELBOW: ICD-10-CM

## 2017-04-05 DIAGNOSIS — M25.521 RIGHT ELBOW PAIN: Primary | ICD-10-CM

## 2017-04-05 PROCEDURE — 97110 THERAPEUTIC EXERCISES: CPT | Mod: GO | Performed by: OCCUPATIONAL THERAPIST

## 2017-04-05 PROCEDURE — 97165 OT EVAL LOW COMPLEX 30 MIN: CPT | Mod: GO | Performed by: OCCUPATIONAL THERAPIST

## 2017-04-05 PROCEDURE — 97112 NEUROMUSCULAR REEDUCATION: CPT | Mod: GO | Performed by: OCCUPATIONAL THERAPIST

## 2017-04-05 NOTE — MR AVS SNAPSHOT
After Visit Summary   4/5/2017    Johnna Severson    MRN: [id number]           Patient Information     Date Of Birth          1975        Visit Information        Provider Department      4/5/2017 2:00 PM Kailee Arechiga [facility] [place]        Today's Diagnoses     Right elbow pain    -  1    Lateral epicondylitis of right elbow           Follow-ups after your visit        Your next 10 appointments already scheduled     Apr 11, 2017 12:00 PM CDT   [facility] Hand with Kailee Arechiga   [facility] [place] ([facility] [place] [facility])    [address]  [address]  [place] [address]   [phone]            Apr 19, 2017  2:30 PM CDT   [facility] with Kailee Arechiga   [facility] [place] ([facility] [place] [facility])    [address]  [address]  [place] [address]   [phone]            Apr 26, 2017  2:30 PM CDT   [facility] Hand with Kailee Arechiga   [facility] [place] ([facility] [place] [facility])    [address]  [address]  [place] [address]   [phone]            May 03, 2017  2:30 PM CDT   [facility] Hand with Kailee Arechiga   [facility] [place] ([facility] [place] [facility])    [address]  [address]  [place] [address]   [phone]              Who to contact     If you have questions or need follow up information about today's clinic visit or your schedule please contact [facility] [place] directly at [phone].  Normal or non-critical lab and imaging results will be communicated to you by MyChart, letter or phone within 4 business days after the clinic has received the results. If you do not hear from us within 7 days, please contact the clinic through MyChart or phone. If you have a critical or abnormal lab result, we will notify you by phone as soon as possible.  Submit  refill requests through [employer] or call your pharmacy and they will forward the refill request to us. Please allow 3 business days for your refill to be completed.          Additional Information About Your Visit        [employer] Information     [employer] gives you secure access to your electronic health record. If you see a primary care provider, you can also send messages to your care team and make appointments. If you have questions, please call your primary care clinic.  If you do not have a primary care provider, please call [phone] and they will assist you.        Care EveryWhere ID     This is your Care EveryWhere ID. This could be used by other organizations to access your [place] medical records  [id number]         Blood Pressure from Last 3 Encounters:   03/14/17 126/72   03/13/17 124/74   02/01/17 132/72    Weight from Last 3 Encounters:   03/14/17 90.7 kg (200 lb)   03/13/17 90.8 kg (200 lb 2 oz)   02/01/17 97.1 kg (214 lb)              We Performed the Following     HC OT EVAL, LOW COMPLEXITY     [facility] INITIAL EVAL REPORT     NEUROMUSCULAR RE-EDUCATION     THERAPEUTIC EXERCISES        Primary Care Provider Office Phone # Fax #    Fiona Black [phone]       [address] [address]        Thank you!     Thank you for choosing [facility]  for your care. Our goal is always to provide you with excellent care. Hearing back from our patients is one way we can continue to improve our services. Please take a few minutes to complete the written survey that you may receive in the mail after your visit with us. Thank you!             Your Updated Medication List - Protect others around you: Learn how to safely use, store and throw away your medicines at www.disposemymeds.org.          This list is accurate as of: 4/5/17  3:02 PM.  Always use your most recent med list.                   Brand  Name Dispense Instructions for use    albuterol 108 (90 BASE) MCG/ACT Inhaler    PROAIR HFA/PROVENTIL HFA/VENTOLIN HFA    18 g    Inhale 2 puffs into the lungs every 6 hours as needed for shortness of breath / dyspnea Profile Rx: patient will contact pharmacy when needed       ALPRAZOLAM XR 1 MG 24 hr tablet   Generic drug:  ALPRAZolam     1 tablet    Take 1 tablet (1 mg) by mouth 2 times daily Prescribed by psychiatry       atenolol 25 MG tablet    TENORMIN    90 tablet    Take 1 tablet (25 mg) by mouth daily       B COMPLEX PO      Take  by mouth.       cholecalciferol 5000 UNITS Caps capsule    vitamin D3     Take by mouth daily       Cod Liver Oil 1250-135 UNITS Caps      Take  by mouth.       cyclobenzaprine 10 MG tablet    FLEXERIL    90 tablet    Take 1 tablet (10 mg) by mouth 3 times daily as needed for muscle spasms       hydrochlorothiazide 25 MG tablet    HYDRODIURIL    90 tablet    Take 1 tablet (25 mg) by mouth as needed       NASONEX 50 MCG/ACT spray   Generic drug:  mometasone      Spray 2 sprays into both nostrils daily.       * order for DME     960 mL    Magnesium chloride 12.5% solution.  Take 2-4 tablespoons daily.  Mix in juice or water before taking.  Keep in refrigerator.       * order for DME     1 Device    Equipment being ordered: elbow arm band       PROBIOTIC PO      Take  by mouth.       PROGESTERONE PO      Take 200 mg by mouth Take one capsule by mouth at bedtime on days 10-28 of cycle. Stop if period starts.       SUMAtriptan 100 MG tablet    IMITREX    9 tablet    Take 1 tablet (100 mg) by mouth at onset of headache for migraine May repeat in 2 hours if needed: max 2/day;       * UNABLE TO FIND      MEDICATION NAME: Triiodo-L-Thyroninr (T3) SR 5 mcg capsule - take one capsule by mouth in earlyafternoon       * UNABLE TO FIND     1 each    MEDICATION NAME: T4 95 MCG/T3 12.9 MCG capsule - take one capsule by mouth in the morning on empty stomach       * Notice:  This list has 4  medication(s) that are the same as other medications prescribed for you. Read the directions carefully, and ask your doctor or other care provider to review them with you.

## 2017-04-05 NOTE — PROGRESS NOTES
"Hand Therapy Initial Evaluation    Current Date:  2017    Subjective:  Annita (\"Kevrin ugalde\") Severson is a 41 year old right hand dominant female.    Diagnosis:   Right elbow pain  DOI:  3/14/17 (therapy referral)    Patient reports symptoms of pain, stiffness/loss of motion, weakness/loss of strength, edema and numbness of the right elbow which occurred due to gradual onset over the past 3 months with unknown eitology. Since onset symptoms are unchanged.  Special tests:  None.  Previous treatment: Arm band using full time with some improvement.  General health as reported by patient is good.  Pertinent medical history includes:overweight, fibromyalgia, thyroid problems, migraines/headaches, numbness/tingling  Medical allergies:  See list in EMR.  Surgical history: none.  Medication history: hormone replacement, thyroid, beta blocker.    Current occupation is Self Employed/[employer]   Currently working in normal job without restrictions  Job Tasks: prolonged standing, driving, lifting/carrying, pushing/pulling, repetitive tasks, computer work and assembly  Barriers include:none  Prior functional level:  no limitations    Additional Occupational Profile Information (patterns of daily living, interests, values and needs): patient does a lot of lifting and packing for work, also wants to resume yoga    Functional Outcome Measure:  Upper Extremity Functional Index  SCORE:   Column Totals: 47/80  (A lower score indicates greater disability.)    Functional Exam:  - no pain, + mild, ++ moderate, +++ severe    ROM:  Wrist 17   AROM(PROM) Right   Flexion with elbow at 90 70   Flexion with elbow extended 60     Resisted Testin/5/17   Elbow Ext -   Elbow Flex in Pronation (bicipital bursa) -   Elbow Flex -   Supination -   Pronation -   Wrist Ext and RD +++   Wrist Ext and UD +   EDC +   Long finger test -   Wrist Flex and RD -   Wrist Flex and UD -     Strength:   (Measured in pounds)    17   Trials " Left Right   1  2  3 66  62  57 30+  25+  28+   Average: 62 28       Elbow Ext  4/5/17   Trials Left Right   1 67 22++     Palpation:  TP trigger point, + mild pain, ++ moderate pain, +++ severe pain   4/5/17   Supraspinatus -   Triceps -   Anconeus -   ECRL -   Supinator -   LEP/ECRB +++   LEP/ECU +   LEP/EDC -   PIN ++   Extensors +     Sensation:  Decreased slightly in Ulnar Nerve distribution; per patient report after end of work day if has pushed too hard    Pain Report:  VAS(0-10) 4/5/17   At Rest: 0-3/10   With Use: 6-7/10   Location:  Lateral elbow and forearm  Pain Quality:  Aching, Sharp and annoying  Frequency: intermittent    Pain is worst:  End of day  Exacerbated by:  Reaching, lifting, gripping  Relieved by:  cold, rest and armband   Progression:  Unchanged  Assessment:  Patient presents with symptoms consistent with diagnosis of Lateral Epicondylitis, with conservative intervention.     Patient's limitations or Problem List includes:  Pain, Decreased ROM/motion, Weakness, Decreased  and Tightness in musculature of the right elbow and forearm which interferes with the patient's ability to perform Work Tasks, Recreational Activities, Household Chores and Driving  as compared to previous level of function.    Rehab Potential:  Excellent - Return to full activity, no limitations    Patient will benefit from skilled Occupational Therapy to increase flexibility, overall strength and  strength and decrease pain to return to previous activity level and resume normal daily tasks and to reach their rehab potential.    Barriers to Learning:  No barrier    Communication Issues:  Patient appears to be able to clearly communicate and understand verbal and written communication and follow directions correctly.    Assessment of Occupational Performance:  5 or more Performance Deficits  Identified Performance Deficits: driving and community mobility, home establishment and management, meal preparation and  cleanup, shopping, sleep, work, leisure activities and reaching      Clinical Decision Making (Complexity): Low complexity    Treatment Explanation:  The following has been discussed with the patient:    RX ordered/plan of care  Anticipated outcomes  Possible risks and side effects    Plan:  Frequency:  1 X week, once daily  Duration:  for 6 weeks    Treatment Plan:    Modalities:  US and Iontophoresis   Therapeutic Exercise: PROM with stretch to wrist extensors and flexors,  and eccentric wrist extension strengthening  Manual Techniques: Friction massage, myofascial release   Neuromuscular:  Nerve gliding, Kinesiotaping  Orthotic Fabrication:  Wrist cock up orthosis, arm band     Discharge Plan:    Achieve all LTG.  Independent in home treatment program.  Reach maximal therapeutic benefit.    Home Program:   Warmth for stiffness to forearm extensors  Ice to lateral elbow after activity for pain  TFM to LEP  MFR and trigger point release with tennis ball to forearm extensors, avoid PIN stie  PROM with stretch to forearm extensors and flexors  Trial Kinesiotaping to LEP   Elbow strap/arm band as needed with heavy activities only, monitor for PIN site irritation; avoid use sleeping  Wear prefab wrist splint sleeping  Avoid activities that exacerbate pain in the elbow  Lift with elbows at sides and palms up    Next Visit:  See in 1 week  Assess response to HEP and kinesiotaping   Consider US to LEP if continued tenderness  Progress to eccentric wrist extension strengthening and 3 position  strengthening  Add Rhythmic stabilization with BOING/flexbar for E/F and P/S as tolerated

## 2017-04-18 ENCOUNTER — TELEPHONE (OUTPATIENT)
Dept: FAMILY MEDICINE | Facility: CLINIC | Age: 42
End: 2017-04-18

## 2017-04-18 NOTE — TELEPHONE ENCOUNTER
Huddled with PCP. Recommendation is still to be seen today. Hold atenolol if she hasn't taken it today, take a half of a dose of atenolol for tomorrow. Informed patient- she takes her atenolol at night so she will skip her dose tonight.   Patient stopped her hctz- adjusted med rec.  Go somewhere to check her bp & go in. She will have someone drive her. Requested that she go to ER if her bp is out of normal range. Patient verbalized understanding.      Her heart rate is between 60-70 while resting now, it only seems to jump with movement so she is resting all evening.  Scheduled around 12:00pm tomorrow with PCP but warned that she is a double book & there will likely be a wait. Patient thanked us for our help & is grateful to see PCP.      Maki Abdalla RN

## 2017-04-18 NOTE — TELEPHONE ENCOUNTER
Reason for call:  Patient reporting a symptom    Symptom or request: heart rate jumping up and down    Duration (how long have symptoms been present): started today    Have you been treated for this before?     Additional comments:     Phone Number patient can be reached at:  [phone]    Best Time:      Can we leave a detailed message on this number:  YES    Call taken on 4/18/2017 at 12:54 PM by Candi Perez

## 2017-04-18 NOTE — TELEPHONE ENCOUNTER
"Patient calls as a red flag due to a fluctuating heart rate of 46-92. She is monitoring with her apple watch & it compares to her manual readings. She complains of feeling \"woozy & faint\" occasionally.   She has an extensive health (only cardiac concern I see listed is hypertension) & hashimoto's & patient wonders \"if there's something is going on with her thyroid.\" Last seen 12/16 & TSH= 0.72.   Denies chest pain, shortness of breath, palpitations, vomiting, diarrhea or any other concerns. She denies caffeine intake, stress or other possible triggers.    She has lost 60lb in the last 4.5 months via the Keto diet but she states she ate normally today. She is down to 170lbs. Prior to today she has been feeling well & has slept well. She is careful about drinking water & electrolytes.     She denies going to ER or seeing another provider. She wants to keep monitoring & see PCP tomorrow- I don't see any availability tomorrow with PCP but Ayde Hodge PA-C has a 10:00 same day. Patient plans to meditate & rest for the remainder of the day. She has someone with her to help her as needed. Patient states she will go to ER if she worsens.    Route high priority to PCP to advise.      Guideline used:  Telephone Triage Protocols for Nurses, Fifth Edition, Genei Abdalla RN    "

## 2017-04-19 ENCOUNTER — THERAPY VISIT (OUTPATIENT)
Dept: OCCUPATIONAL THERAPY | Facility: CLINIC | Age: 42
End: 2017-04-19
Payer: COMMERCIAL

## 2017-04-19 DIAGNOSIS — M77.11 LATERAL EPICONDYLITIS OF RIGHT ELBOW: ICD-10-CM

## 2017-04-19 DIAGNOSIS — M25.521 RIGHT ELBOW PAIN: ICD-10-CM

## 2017-04-19 PROCEDURE — 97110 THERAPEUTIC EXERCISES: CPT | Mod: GO | Performed by: OCCUPATIONAL THERAPIST

## 2017-04-19 PROCEDURE — 97140 MANUAL THERAPY 1/> REGIONS: CPT | Mod: GO | Performed by: OCCUPATIONAL THERAPIST

## 2017-04-19 NOTE — MR AVS SNAPSHOT
After Visit Summary   4/19/2017    Johnna Severson    MRN: [id number]           Patient Information     Date Of Birth          1975        Visit Information        Provider Department      4/19/2017 2:30 PM Kailee Arechiga [facility] [place]        Today's Diagnoses     Right elbow pain        Lateral epicondylitis of right elbow           Follow-ups after your visit        Your next 10 appointments already scheduled     Apr 26, 2017  2:30 PM CDT   [facility] with Kailee Arechiga   [facility] [place] ([facility] [place] [facility])    [address]  [place] [id number] [phone]              Who to contact     If you have questions or need follow up information about today's clinic visit or your schedule please contact [facility] [place] directly at [phone].  Normal or non-critical lab and imaging results will be communicated to you by [employer], letter or phone within 4 business days after the clinic has received the results. If you do not hear from us within 7 days, please contact the clinic through [employer] or phone. If you have a critical or abnormal lab result, we will notify you by phone as soon as possible.  Submit refill requests through [employer] or call your pharmacy and they will forward the refill request to us. Please allow 3 business days for your refill to be completed.          Additional Information About Your Visit        MyChart Information     [employer] gives you secure access to your electronic health record. If you see a primary care provider, you can also send messages to your care team and make appointments. If you have questions, please call your primary care clinic.  If you do not have a primary care provider, please call [phone] and they will assist you.        Care EveryWhere ID     This is your Care EveryWhere ID. This could be used by other  organizations to access your [facility] medical records  [id number]         Blood Pressure from Last 3 Encounters:   03/14/17 126/72   03/13/17 124/74   02/01/17 132/72    Weight from Last 3 Encounters:   03/14/17 90.7 kg (200 lb)   03/13/17 90.8 kg (200 lb 2 oz)   02/01/17 97.1 kg (214 lb)              We Performed the Following     MANUAL THER TECH,1+REGIONS,EA 15 MIN     THERAPEUTIC EXERCISES        Primary Care Provider Office Phone # Fax #    Fiona Black [phone]       [facility] [address] [place] [id number]        Thank you!     Thank you for choosing [facility]  for your care. Our goal is always to provide you with excellent care. Hearing back from our patients is one way we can continue to improve our services. Please take a few minutes to complete the written survey that you may receive in the mail after your visit with us. Thank you!             Your Updated Medication List - Protect others around you: Learn how to safely use, store and throw away your medicines at www.disposemymeds.org.          This list is accurate as of: 4/19/17  3:15 PM.  Always use your most recent med list.                   Brand Name Dispense Instructions for use    albuterol 108 (90 BASE) MCG/ACT Inhaler    PROAIR HFA/PROVENTIL HFA/VENTOLIN HFA    18 g    Inhale 2 puffs into the lungs every 6 hours as needed for shortness of breath / dyspnea Profile Rx: patient will contact pharmacy when needed       ALPRAZOLAM XR 1 MG 24 hr tablet   Generic drug:  ALPRAZolam     1 tablet    Take 1 tablet (1 mg) by mouth 2 times daily Prescribed by psychiatry       atenolol 25 MG tablet    TENORMIN    90 tablet    Take 1 tablet (25 mg) by mouth daily       B COMPLEX PO      Take  by mouth.       cholecalciferol 5000 UNITS Caps capsule    vitamin D3     Take by mouth daily       Cod Liver Oil 1250-135 UNITS Caps      Take  by mouth.        cyclobenzaprine 10 MG tablet    FLEXERIL    90 tablet    Take 1 tablet (10 mg) by mouth 3 times daily as needed for muscle spasms       NASONEX 50 MCG/ACT spray   Generic drug:  mometasone      Spray 2 sprays into both nostrils daily.       * order for DME     960 mL    Magnesium chloride 12.5% solution.  Take 2-4 tablespoons daily.  Mix in juice or water before taking.  Keep in refrigerator.       * order for DME     1 Device    Equipment being ordered: elbow arm band       PROBIOTIC PO      Take  by mouth.       PROGESTERONE PO      Take 200 mg by mouth Take one capsule by mouth at bedtime on days 10-28 of cycle. Stop if period starts.       SUMAtriptan 100 MG tablet    IMITREX    9 tablet    Take 1 tablet (100 mg) by mouth at onset of headache for migraine May repeat in 2 hours if needed: max 2/day;       * UNABLE TO FIND      MEDICATION NAME: Triiodo-L-Thyroninr (T3) SR 5 mcg capsule - take one capsule by mouth in earlyafternoon       * UNABLE TO FIND     1 each    MEDICATION NAME: T4 95 MCG/T3 12.9 MCG capsule - take one capsule by mouth in the morning on empty stomach       * Notice:  This list has 4 medication(s) that are the same as other medications prescribed for you. Read the directions carefully, and ask your doctor or other care provider to review them with you.

## 2017-04-19 NOTE — PROGRESS NOTES
SOAP note objective information for 4/19/2017:    Palpation:  TP trigger point, + mild pain, ++ moderate pain, +++ severe pain   4/5/17 4/19/17   Supraspinatus - -   Triceps - -   Anconeus - -   ECRL - -   Supinator - -   LEP/ECRB +++ +   LEP/ECU + +   LEP/EDC - -   PIN ++ +   Extensors + -     Pain Report:  VAS(0-10) 4/5/17 4/19/17   At Rest: 0-3/10 0/10   With Use: 6-7/10 5/10   Location:  Lateral elbow and forearm    Home Program:   Warmth for stiffness to forearm extensors  Ice to lateral elbow after activity for pain  TFM to LEP  MFR and trigger point release with tennis ball to forearm extensors, avoid PIN stie  PROM with stretch to forearm extensors and flexors  Radial nerve gliding  Eccentric wrist extension strengthening  Kinesiotaping to LEP   Elbow strap/arm band as needed with heavy activities only, monitor for PIN site irritation; avoid use sleeping  Wear prefab wrist splint sleeping  Avoid activities that exacerbate pain in the elbow  Lift with elbows at sides and palms up    Next Visit:  See in 1 week  Assess response to eccentric wrist extension strengthening  Defer US to LEP as tenderness improved  Progress to 3 position  strengthening  Add Rhythmic stabilization with BOING/flexbar for E/F and P/S as tolerated    Please refer to the daily flowsheet for treatment today, total treatment time and time spent performing 1:1 timed codes.

## 2017-04-25 DIAGNOSIS — J45.20 INTERMITTENT ASTHMA, UNCOMPLICATED: ICD-10-CM

## 2017-04-25 NOTE — TELEPHONE ENCOUNTER
Medication Detail      Disp Refills Start End NAOMY   albuterol (PROAIR HFA/PROVENTIL HFA/VENTOLIN HFA) 108 (90 BASE) MCG/ACT Inhaler 18 g 3 12/16/2016  No   Sig: Inhale 2 puffs into the lungs every 6 hours as needed for shortness of breath / dyspnea Profile Rx: patient will contact pharmacy when needed   Class: Fax   Route: Inhalation        Last Office Visit with [facility], [facility] or [facility] prescribing provider:  3/13/2017   Future Office Visit:       Date of Last Asthma Action Plan Letter:   Asthma Action Plan Q1 Year    Topic Date Due     Asthma Action Plan - yearly  11/04/2015      Asthma Control Test:   ACT Total Scores 3/13/2017   ACT TOTAL SCORE (Goal Greater than or Equal to 20) 25   In the past 12 months, how many times did you visit the emergency room for your asthma without being admitted to the hospital? 0   In the past 12 months, how many times were you hospitalized overnight because of your asthma? 0       Date of Last Spirometry Test:   No results found for this or any previous visit.

## 2017-04-27 RX ORDER — ALBUTEROL SULFATE 90 UG/1
AEROSOL, METERED RESPIRATORY (INHALATION)
Qty: 18 G | Refills: 0 | Status: SHIPPED | OUTPATIENT
Start: 2017-04-27 | End: 2017-05-26

## 2017-04-27 NOTE — TELEPHONE ENCOUNTER
Prescription approved per [facility] Refill Protocol.    Saravanan Hinson RN

## 2017-05-26 ENCOUNTER — OFFICE VISIT (OUTPATIENT)
Dept: FAMILY MEDICINE | Facility: CLINIC | Age: 42
End: 2017-05-26
Payer: COMMERCIAL

## 2017-05-26 VITALS
WEIGHT: 176 LBS | BODY MASS INDEX: 30.05 KG/M2 | HEIGHT: 64 IN | SYSTOLIC BLOOD PRESSURE: 118 MMHG | DIASTOLIC BLOOD PRESSURE: 70 MMHG | TEMPERATURE: 98.2 F | HEART RATE: 66 BPM

## 2017-05-26 DIAGNOSIS — F41.1 GAD (GENERALIZED ANXIETY DISORDER): ICD-10-CM

## 2017-05-26 DIAGNOSIS — Z30.09 ENCOUNTER FOR OTHER GENERAL COUNSELING OR ADVICE ON CONTRACEPTION: Primary | ICD-10-CM

## 2017-05-26 DIAGNOSIS — I10 HYPERTENSION GOAL BP (BLOOD PRESSURE) < 140/90: ICD-10-CM

## 2017-05-26 DIAGNOSIS — J45.20 INTERMITTENT ASTHMA, UNCOMPLICATED: ICD-10-CM

## 2017-05-26 DIAGNOSIS — E03.8 OTHER SPECIFIED HYPOTHYROIDISM: ICD-10-CM

## 2017-05-26 DIAGNOSIS — E66.9 NON MORBID OBESITY, UNSPECIFIED OBESITY TYPE: ICD-10-CM

## 2017-05-26 LAB
ANION GAP SERPL CALCULATED.3IONS-SCNC: 9 MMOL/L (ref 3–14)
BUN SERPL-MCNC: 15 MG/DL (ref 7–30)
CALCIUM SERPL-MCNC: 9.8 MG/DL (ref 8.5–10.1)
CHLORIDE SERPL-SCNC: 102 MMOL/L (ref 94–109)
CO2 SERPL-SCNC: 26 MMOL/L (ref 20–32)
CREAT SERPL-MCNC: 0.78 MG/DL (ref 0.52–1.04)
FSH SERPL-ACNC: 4.4 IU/L
GFR SERPL CREATININE-BSD FRML MDRD: 81 ML/MIN/1.7M2
GLUCOSE SERPL-MCNC: 85 MG/DL (ref 70–99)
POTASSIUM SERPL-SCNC: 4.2 MMOL/L (ref 3.4–5.3)
SODIUM SERPL-SCNC: 136 MMOL/L (ref 133–144)

## 2017-05-26 PROCEDURE — 99214 OFFICE O/P EST MOD 30 MIN: CPT | Performed by: FAMILY MEDICINE

## 2017-05-26 PROCEDURE — 80048 BASIC METABOLIC PNL TOTAL CA: CPT | Performed by: FAMILY MEDICINE

## 2017-05-26 PROCEDURE — 83001 ASSAY OF GONADOTROPIN (FSH): CPT | Performed by: FAMILY MEDICINE

## 2017-05-26 PROCEDURE — 36415 COLL VENOUS BLD VENIPUNCTURE: CPT | Performed by: FAMILY MEDICINE

## 2017-05-26 NOTE — MR AVS SNAPSHOT
After Visit Summary   5/26/2017    Johnna Severson    MRN: [id number]           Patient Information     Date Of Birth          1975        Visit Information        Provider Department      5/26/2017 3:00 PM Fiona Black MD [facility]        Today's Diagnoses     Encounter for other general counseling or advice on contraception    -  1    Hypertension goal BP (blood pressure) < 140/90        Other specified hypothyroidism        CHEYENNE (generalized anxiety disorder)          Care Instructions    Look into the Nexplanon implant device for birth control. OB/GYN can place this.          Follow-ups after your visit        Who to contact     If you have questions or need follow up information about today's clinic visit or your schedule please contact [facility] directly at [phone].  Normal or non-critical lab and imaging results will be communicated to you by [employer], letter or phone within 4 business days after the clinic has received the results. If you do not hear from us within 7 days, please contact the clinic through [employer] or phone. If you have a critical or abnormal lab result, we will notify you by phone as soon as possible.  Submit refill requests through [employer] or call your pharmacy and they will forward the refill request to us. Please allow 3 business days for your refill to be completed.          Additional Information About Your Visit        MyChart Information     [employer] gives you secure access to your electronic health record. If you see a primary care provider, you can also send messages to your care team and make appointments. If you have questions, please call your primary care clinic.  If you do not have a primary care provider, please call [phone] and they will assist you.        Care EveryWhere ID     This is your Care EveryWhere ID. This could be used by other organizations to access your [facility]  "records  [id number]        Your Vitals Were     Pulse Temperature Height BMI (Body Mass Index)          66 98.2  F (36.8  C) (Oral) 5' 4\" (1.626 m) 30.21 kg/m2         Blood Pressure from Last 3 Encounters:   05/26/17 118/70   03/14/17 126/72   03/13/17 124/74    Weight from Last 3 Encounters:   05/26/17 176 lb (79.8 kg)   03/14/17 200 lb (90.7 kg)   03/13/17 200 lb 2 oz (90.8 kg)              We Performed the Following     Basic metabolic panel     Follicle stimulating hormone          Today's Medication Changes          These changes are accurate as of: 5/26/17  3:44 PM.  If you have any questions, ask your nurse or doctor.               Stop taking these medicines if you haven't already. Please contact your care team if you have questions.     atenolol 25 MG tablet   Commonly known as:  TENORMIN                    Primary Care Provider Office Phone # Fax #    Fiona Black [phone]       [address] [address]        Thank you!     Thank you for choosing [facility]  for your care. Our goal is always to provide you with excellent care. Hearing back from our patients is one way we can continue to improve our services. Please take a few minutes to complete the written survey that you may receive in the mail after your visit with us. Thank you!             Your Updated Medication List - Protect others around you: Learn how to safely use, store and throw away your medicines at www.disposemymeds.org.          This list is accurate as of: 5/26/17  3:44 PM.  Always use your most recent med list.                   Brand Name Dispense Instructions for use    ALPRAZOLAM XR 1 MG 24 hr tablet   Generic drug:  ALPRAZolam     1 tablet    Take 1 tablet (1 mg) by mouth 2 times daily Prescribed by psychiatry       B COMPLEX PO      Take  by mouth.       cholecalciferol 5000 UNITS Caps capsule    vitamin D3     Take by mouth daily       " Cod Liver Oil 1250-135 UNITS Caps      Take  by mouth.       cyclobenzaprine 10 MG tablet    FLEXERIL    90 tablet    Take 1 tablet (10 mg) by mouth 3 times daily as needed for muscle spasms       NASONEX 50 MCG/ACT spray   Generic drug:  mometasone      Spray 2 sprays into both nostrils daily.       * order for DME     960 mL    Magnesium chloride 12.5% solution.  Take 2-4 tablespoons daily.  Mix in juice or water before taking.  Keep in refrigerator.       * order for DME     1 Device    Equipment being ordered: elbow arm band       PROBIOTIC PO      Take  by mouth.       PROGESTERONE PO      Take 200 mg by mouth Take one capsule by mouth at bedtime on days 10-28 of cycle. Stop if period starts.       SUMAtriptan 100 MG tablet    IMITREX    9 tablet    Take 1 tablet (100 mg) by mouth at onset of headache for migraine May repeat in 2 hours if needed: max 2/day;       * UNABLE TO FIND      MEDICATION NAME: Triiodo-L-Thyroninr (T3) SR 5 mcg capsule - take one capsule by mouth in earlyafternoon       * UNABLE TO FIND     1 each    MEDICATION NAME: T4 95 MCG/T3 12.9 MCG capsule - take one capsule by mouth in the morning on empty stomach       VENTOLIN  (90 BASE) MCG/ACT Inhaler   Generic drug:  albuterol     18 g    INHALE 2 PUFFS INTO THE LUNGS EVERY 6 HOURS AS NEEDED FOR SHORTNESS OF BREATH       * Notice:  This list has 4 medication(s) that are the same as other medications prescribed for you. Read the directions carefully, and ask your doctor or other care provider to review them with you.

## 2017-05-26 NOTE — PROGRESS NOTES
"  SUBJECTIVE:                                                    Johnna Severson is a 41 year old female who presents to clinic today for the following health issues:    Patient presents to clinic today to discuss birth control.        Patient has lost 70 pounds since December on a ketogenic diet.  Since losing weight she feels a lot better.    Blood pressure - She had an incident where she was at a concert and felt like she was going to faint. Checked her pulse rate before she passed out and it was 40.  She was taken off atenolol and has stopped her diuretic.     Insomnia - When she first started her diet insomnia was bad.  On occasion she takes Xanax to help her sleep.      Mood - Her and Arnold are . Her anxiety has improved and she's having less panic attacks. She has been seeing a therapist weekly for four weeks now. She feels she has an adequate support team in place.    Birth control - Through the years she's had issues with estrogen causing migraines. She didn't tolerate the Mirena she had placed in 2012; \"gave birth to it\" when her body tried to get rid of it. From placement she felt like it was place wrong, felt bloated, and could see the strings coming out. She never made it to the point of no periods. She's been off progesterone since 5/6. Patient is curious what type of non hormonal birth control options there are.       Wt Readings from Last 5 Encounters:   05/26/17 176 lb (79.8 kg)   03/14/17 200 lb (90.7 kg)   03/13/17 200 lb 2 oz (90.8 kg)   02/01/17 214 lb (97.1 kg)   12/16/16 233 lb 6.4 oz (105.9 kg)     Problem list and histories reviewed & adjusted, as indicated.  Additional history: as documented    Patient Active Problem List   Diagnosis     Depression, major     Migraine headache     CHEYENNE (generalized anxiety disorder)     CARDIOVASCULAR SCREENING; LDL GOAL LESS THAN 160     Intermittent asthma     Vitamin D deficiency     Elevated LFTs     Hypertension goal BP (blood pressure) < 140/90 "     Obesity     Encounter for IUD insertion     Zinc deficiency     IBS (irritable bowel syndrome)     Lactose intolerance     Nonallergic rhinitis     Diagnostic skin and sensitization tests     Keratosis pilaris     Other specified hypothyroidism     Fibromyalgia     Migraine with aura and without status migrainosus, not intractable     overweight with BMI >40     Right elbow pain     Lateral epicondylitis of right elbow     Past Surgical History:   Procedure Laterality Date     CRYOTHERAPY, CERVICAL  2002       Social History   Substance Use Topics     Smoking status: Former Smoker     Packs/day: 0.50     Years: 10.00     Types: Cigarettes     Quit date: 8/18/2002     Smokeless tobacco: Former User     Alcohol use No     Family History   Problem Relation Age of Onset     DIABETES Maternal Grandfather      CANCER Maternal Grandfather      brain     Cardiovascular Maternal Grandmother      carotid stenosis, CHF     Hypertension Maternal Grandmother      Lipids Brother      Allergies Brother      Hypertension Mother      Allergies Mother      Arthritis Mother      Other - See Comments Mother      Mangioma removed Sept 2015     DIABETES Father      C.A.D. Father      CABG, stents, 52     HEART DISEASE Father      Alcohol/Drug Paternal Grandfather      Thyroid Disease Maternal Aunt      CANCER Other 41     uterine      CANCER Maternal Aunt      ov     CANCER Other      maternal cousin         Current Outpatient Prescriptions   Medication Sig Dispense Refill     VENTOLIN  (90 BASE) MCG/ACT Inhaler INHALE 2 PUFFS INTO THE LUNGS EVERY 6 HOURS AS NEEDED FOR SHORTNESS OF BREATH 18 g 0     order for DME Equipment being ordered: elbow arm band 1 Device 0     SUMAtriptan (IMITREX) 100 MG tablet Take 1 tablet (100 mg) by mouth at onset of headache for migraine May repeat in 2 hours if needed: max 2/day; 9 tablet 11     cyclobenzaprine (FLEXERIL) 10 MG tablet Take 1 tablet (10 mg) by mouth 3 times daily as needed for  muscle spasms 90 tablet 1     UNABLE TO FIND MEDICATION NAME: T4 95 MCG/T3 12.9 MCG capsule - take one capsule by mouth in the morning on empty stomach 1 each 0     ALPRAZolam (ALPRAZOLAM XR) 1 MG 24 hr tablet Take 1 tablet (1 mg) by mouth 2 times daily Prescribed by psychiatry 1 tablet 0     Progesterone Micronized (PROGESTERONE PO) Take 200 mg by mouth Take one capsule by mouth at bedtime on days 10-28 of cycle. Stop if period starts.       UNABLE TO FIND MEDICATION NAME: Triiodo-L-Thyroninr (T3) SR 5 mcg capsule - take one capsule by mouth in earlyafternoon       order for DME Magnesium chloride 12.5% solution.  Take 2-4 tablespoons daily.  Mix in juice or water before taking.  Keep in refrigerator. 960 mL 6     cholecalciferol (VITAMIN D3) 5000 UNITS CAPS capsule Take by mouth daily       mometasone (NASONEX) 50 MCG/ACT nasal spray Spray 2 sprays into both nostrils daily.       Cod Liver Oil 1250-135 UNITS CAPS Take  by mouth.       B Complex Vitamins (B COMPLEX PO) Take  by mouth.       Probiotic Product (PROBIOTIC PO) Take  by mouth.       [DISCONTINUED] ORDER FOR DME Magnesium chloride 12.5% solution.  Take 2-4 tablespoons daily.  Mix in juice or water before taking.  Keep in refrigerator. 480 mL 6     Allergies   Allergen Reactions     Estrogens [Conjugated Estrogens]      Migraine headaches     Milk Protein GI Disturbance     IgE to milk NEGATIVE in 11/12 per [facility]      No Clinical Screening - See Comments      MRI contrast     Gluten Fatigue, Rash and GI Disturbance     5/11 celiac labs NEGATIVE     Labs reviewed in EPIC  Reviewed and updated as needed this visit by clinical staff  Reviewed and updated as needed this visit by Provider    ROS:  Constitutional, HEENT, cardiovascular, pulmonary, GI, , musculoskeletal, neuro, skin, endocrine and psych systems are negative, except as otherwise noted.    This document serves as a record of the services and decisions personally performed and made by  "Fiona Black MD. It was created on his/her behalf by Annika Hendricks, trained medical scribe. The creation of this document is based the provider's statements to the medical scribes.    Marixa Hendricks 3:22 PM, May 26, 2017  OBJECTIVE:                                                    /70 (BP Location: Right arm, Patient Position: Chair, Cuff Size: Adult Regular)  Pulse 66  Temp 98.2  F (36.8  C) (Oral)  Ht 5' 4\" (1.626 m)  Wt 176 lb (79.8 kg)  BMI 30.21 kg/m2  Body mass index is 30.21 kg/(m^2).  GENERAL: healthy, alert and no distress, overweight    Diagnostic Test Results:  none      ASSESSMENT/PLAN:                                                    (Z30.09) Encounter for other general counseling or advice on contraception  (primary encounter diagnosis)  Comment: Discussed progesterone only contraception options like progesterone pills and Nexplanon implant, along with tubal ligation.  Plan: Follicle stimulating hormone, she is asking for FSH testing and other hormonal testing.  I am not         She will look into the Nexplanon implant and schedule with OB/GYN for placement.     (I10) Hypertension goal BP (blood pressure) < 140/90  Comment: Well controlled. Atenolol was stopped with weight loss.  Plan: Basic metabolic panel      (E03.8) Other specified hypothyroidism  Comment: Stable.  Plan: followed by Dr. Alfaro     (F41.1) CHEYENNE (generalized anxiety disorder)  Comment: Stable. Overall she feels her anxiety has decreased with less panic attacks. Pt is seeing a therapist weekly with adequate support group for current life events. She is using Xanax as needed for insomnia and this is prescribed by psychiatry  Plan: per therapy and psychiatry    (E66.9) Non morbid obesity, unspecified obesity type  Comment: she has lost 70 pounds with nutrition changes   Plan: continue to monitor her success          Patient Instructions   Look into the Nexplanon implant device for birth control. OB/GYN can " place this.      The information in this document, created by the medical scribe for me, accurately reflects the services I personally performed and the decisions made by me. I have reviewed and approved this document for accuracy prior to leaving the patient care area.  Fiona Black MD  3:23 PM, 05/26/17    Fiona Black MD  [facility]

## 2017-05-26 NOTE — NURSING NOTE
"Chief Complaint   Patient presents with     Contraception       Initial /70 (BP Location: Right arm, Patient Position: Chair, Cuff Size: Adult Regular)  Pulse 66  Temp 98.2  F (36.8  C) (Oral)  Ht 5' 4\" (1.626 m)  Wt 176 lb (79.8 kg)  BMI 30.21 kg/m2 Estimated body mass index is 30.21 kg/(m^2) as calculated from the following:    Height as of this encounter: 5' 4\" (1.626 m).    Weight as of this encounter: 176 lb (79.8 kg).  Medication Reconciliation: complete   Haley Collier MA      "

## 2017-05-30 NOTE — TELEPHONE ENCOUNTER
VENTOLIN  (90 BASE) MCG/ACT Inhaler    0 ordered  Edit     Summary: INHALE 2 PUFFS INTO THE LUNGS EVERY 6 HOURS AS NEEDED FOR SHORTNESS OF BREATH, Disp-18 g, R-0, E-Prescribe   Start: 4/27/2017  Ord/Sold: 4/27/2017 (O)  Report  Taking:   Long-term:   Pharmacy: [facility] [address] [place] [address] [address] AT [facility] [address]  Med Dose History       Patient Sig: INHALE 2 PUFFS INTO THE LUNGS EVERY 6 HOURS AS NEEDED FOR SHORTNESS OF BREATH       Ordered on: 4/27/2017       Authorized by: EDEL DEAN       Dispense: 18 g       Prior Authorization: Request PA          Last Office Visit with [facility], [facility] or [facility] prescribing provider:  5-   Future Office Visit:       Date of Last Asthma Action Plan Letter:   Asthma Action Plan Q1 Year    Topic Date Due     Asthma Action Plan - yearly  11/04/2015      Asthma Control Test:   ACT Total Scores 3/13/2017   ACT TOTAL SCORE (Goal Greater than or Equal to 20) 25   In the past 12 months, how many times did you visit the emergency room for your asthma without being admitted to the hospital? 0   In the past 12 months, how many times were you hospitalized overnight because of your asthma? 0       Date of Last Spirometry Test:   No results found for this or any previous visit.

## 2017-05-31 RX ORDER — ALBUTEROL SULFATE 90 UG/1
AEROSOL, METERED RESPIRATORY (INHALATION)
Qty: 18 G | Refills: 3 | Status: SHIPPED | OUTPATIENT
Start: 2017-05-31 | End: 2017-12-15

## 2017-06-07 ENCOUNTER — TELEPHONE (OUTPATIENT)
Dept: OBGYN | Facility: CLINIC | Age: 42
End: 2017-06-07

## 2017-06-07 NOTE — TELEPHONE ENCOUNTER
Reason for Call:  Other appointment    Detailed comments: Patient would like to set up a consult for a birth control implant with Nesha Islas. The implant cannot contain estrogen.     Phone Number Patient can be reached at: Home number on file [phone] (home)    Best Time: any    Can we leave a detailed message on this number? YES    Call taken on 6/7/2017 at 10:21 AM by Keisha Harden

## 2017-06-09 ENCOUNTER — MYC MEDICAL ADVICE (OUTPATIENT)
Dept: FAMILY MEDICINE | Facility: CLINIC | Age: 42
End: 2017-06-09

## 2017-07-07 PROBLEM — M25.521 RIGHT ELBOW PAIN: Status: RESOLVED | Noted: 2017-04-05 | Resolved: 2017-07-07

## 2017-07-07 PROBLEM — M77.11 LATERAL EPICONDYLITIS OF RIGHT ELBOW: Status: RESOLVED | Noted: 2017-04-05 | Resolved: 2017-07-07

## 2017-07-07 NOTE — PROGRESS NOTES
Discharge Summary - Hand Therapy  Pt cancelled appointment 5/3/17 and has not returned for therapy.  Assume all goals met to pt satisfaction.  D/C Hand Therapy.

## 2017-07-20 ENCOUNTER — OFFICE VISIT (OUTPATIENT)
Dept: OBGYN | Facility: CLINIC | Age: 42
End: 2017-07-20
Payer: COMMERCIAL

## 2017-07-20 VITALS
DIASTOLIC BLOOD PRESSURE: 62 MMHG | TEMPERATURE: 98.3 F | HEIGHT: 64 IN | SYSTOLIC BLOOD PRESSURE: 114 MMHG | WEIGHT: 165.2 LBS | BODY MASS INDEX: 28.2 KG/M2

## 2017-07-20 DIAGNOSIS — Z30.09 GENERAL COUNSELING FOR PRESCRIPTION OF ORAL CONTRACEPTIVES: Primary | ICD-10-CM

## 2017-07-20 PROCEDURE — 99202 OFFICE O/P NEW SF 15 MIN: CPT | Performed by: OBSTETRICS & GYNECOLOGY

## 2017-07-20 RX ORDER — ACETAMINOPHEN AND CODEINE PHOSPHATE 120; 12 MG/5ML; MG/5ML
1 SOLUTION ORAL DAILY
Qty: 84 TABLET | Refills: 3 | Status: SHIPPED | OUTPATIENT
Start: 2017-07-20 | End: 2018-03-07

## 2017-07-20 NOTE — NURSING NOTE
"Chief Complaint   Patient presents with     Consult     BC consult- can't do estrogen, expelled Mirena in -.        Initial /62  Temp 98.3  F (36.8  C) (Oral)  Ht 5' 4\" (1.626 m)  Wt 165 lb 3.2 oz (74.9 kg)  LMP 2017  BMI 28.36 kg/m2 Estimated body mass index is 28.36 kg/(m^2) as calculated from the following:    Height as of this encounter: 5' 4\" (1.626 m).    Weight as of this encounter: 165 lb 3.2 oz (74.9 kg).  BP completed using cuff size: regular        The following HM Due: mammogram      The following patient reported/Care Every where data was sent to:  P ABSTRACT QUALITY INITIATIVES [27898]       patient has appointment for today    Madeline Baugh CMA               "

## 2017-07-20 NOTE — MR AVS SNAPSHOT
"              After Visit Summary   7/20/2017    Johnna Severson    MRN: [id number]           Patient Information     Date Of Birth          1975        Visit Information        Provider Department      7/20/2017 3:30 PM Nesha Islas MD [facility]        Today's Diagnoses     General counseling for prescription of oral contraceptives    -  1       Follow-ups after your visit        Who to contact     If you have questions or need follow up information about today's clinic visit or your schedule please contact [facility] directly at [phone].  Normal or non-critical lab and imaging results will be communicated to you by [employer], letter or phone within 4 business days after the clinic has received the results. If you do not hear from us within 7 days, please contact the clinic through [employer] or phone. If you have a critical or abnormal lab result, we will notify you by phone as soon as possible.  Submit refill requests through [employer] or call your pharmacy and they will forward the refill request to us. Please allow 3 business days for your refill to be completed.          Additional Information About Your Visit        MyChart Information     [employer] gives you secure access to your electronic health record. If you see a primary care provider, you can also send messages to your care team and make appointments. If you have questions, please call your primary care clinic.  If you do not have a primary care provider, please call [phone] and they will assist you.        Care EveryWhere ID     This is your Care EveryWhere ID. This could be used by other organizations to access your [place] medical records  [id number]        Your Vitals Were     Temperature Height Last Period BMI (Body Mass Index)          98.3  F (36.8  C) (Oral) 5' 4\" (1.626 m) 07/05/2017 28.36 kg/m2         Blood Pressure from Last 3 Encounters:   07/20/17 114/62   05/26/17 118/70 "   03/14/17 126/72    Weight from Last 3 Encounters:   07/20/17 165 lb 3.2 oz (74.9 kg)   05/26/17 176 lb (79.8 kg)   03/14/17 200 lb (90.7 kg)              Today, you had the following     No orders found for display         Today's Medication Changes          These changes are accurate as of: 7/20/17 11:59 PM.  If you have any questions, ask your nurse or doctor.               Start taking these medicines.        Dose/Directions    norethindrone 0.35 MG per tablet   Commonly known as:  MICRONOR   Used for:  General counseling for prescription of oral contraceptives   Started by:  Nesha Islas MD        Dose:  1 tablet   Take 1 tablet (0.35 mg) by mouth daily   Quantity:  84 tablet   Refills:  3            Where to get your medicines      These medications were sent to [id number] - [facility], [address] 10 AT [place] [address], [facility] [address]     Phone:  [phone]     norethindrone 0.35 MG per tablet                Primary Care Provider Office Phone # Fax #    Fiona Black [phone]       [address] [address]        Equal Access to Services     MADISYN RECINOS AH: Hadii sameer ku hadasho Soomaali, waaxda luqadaha, qaybta kaalmada adeegyada, waxay anain hayaan emiliano ugalde. So [facility] [phone].    ATENCIÓN: Si habla español, tiene a montaño disposición servicios gratuitos de asistencia lingüística. Llame al [phone].    We comply with applicable federal civil rights laws and [place] laws. We do not discriminate on the basis of race, color, national origin, age, disability sex, sexual orientation or gender identity.            Thank you!     Thank you for choosing [facility]  for your care. Our goal is always to provide you with excellent care. Hearing back from our patients is one way we can continue to improve our services. Please take a few  minutes to complete the written survey that you may receive in the mail after your visit with us. Thank you!             Your Updated Medication List - Protect others around you: Learn how to safely use, store and throw away your medicines at www.disposemymeds.org.          This list is accurate as of: 7/20/17 11:59 PM.  Always use your most recent med list.                   Brand Name Dispense Instructions for use Diagnosis    ALPRAZOLAM XR 1 MG 24 hr tablet   Generic drug:  ALPRAZolam     1 tablet    Take 1 tablet (1 mg) by mouth 2 times daily Prescribed by psychiatry    CHEYENNE (generalized anxiety disorder), Panic disorder       B COMPLEX PO      Take  by mouth.        cholecalciferol 5000 UNITS Caps capsule    vitamin D3     Take by mouth daily        Cod Liver Oil 1250-135 UNITS Caps      Take  by mouth.        cyclobenzaprine 10 MG tablet    FLEXERIL    90 tablet    Take 1 tablet (10 mg) by mouth 3 times daily as needed for muscle spasms    Fibromyalgia       NASONEX 50 MCG/ACT spray   Generic drug:  mometasone      Spray 2 sprays into both nostrils daily.        norethindrone 0.35 MG per tablet    MICRONOR    84 tablet    Take 1 tablet (0.35 mg) by mouth daily    General counseling for prescription of oral contraceptives       * order for DME     960 mL    Magnesium chloride 12.5% solution.  Take 2-4 tablespoons daily.  Mix in juice or water before taking.  Keep in refrigerator.    Chronic migraine without aura without status migrainosus, not intractable       * order for DME     1 Device    Equipment being ordered: elbow arm band    Right lateral epicondylitis       PROBIOTIC PO      Take  by mouth.        PROGESTERONE PO      Take 200 mg by mouth Take one capsule by mouth at bedtime on days 10-28 of cycle. Stop if period starts.        SUMAtriptan 100 MG tablet    IMITREX    9 tablet    Take 1 tablet (100 mg) by mouth at onset of headache for migraine May repeat in 2 hours if needed: max 2/day;    Migraine  with aura and without status migrainosus, not intractable       * UNABLE TO FIND      MEDICATION NAME: Triiodo-L-Thyroninr (T3) SR 5 mcg capsule - take one capsule by mouth in earlyafternoon        * UNABLE TO FIND     1 each    MEDICATION NAME: T4 95 MCG/T3 12.9 MCG capsule - take one capsule by mouth in the morning on empty stomach        VENTOLIN  (90 BASE) MCG/ACT Inhaler   Generic drug:  albuterol     18 g    INHALE 2 PUFFS INTO THE LUNGS EVERY 6 HOURS AS NEEDED FOR SHORTNESS OF BREATH    Intermittent asthma, uncomplicated       * Notice:  This list has 4 medication(s) that are the same as other medications prescribed for you. Read the directions carefully, and ask your doctor or other care provider to review them with you.

## 2017-07-24 NOTE — PROGRESS NOTES
CC/HPI:  41 year old  presents for discussion of contraception  Cannot take estrogen due to migraines  Expelled Mirena IUD  H/o very heavy periods  Recently periods have been better. Lost 80 pounds so far on ketogenic diet. Has gotten off some of her chronic medications as a result.   Taking cyclic progesterone to manage periods through her naturopath. Had saliva testing done.  Early menopause in her family. She does not desire pregnancy at all. Children are older now.   Menses currently regular, and manageable.       Past Medical History:   Diagnosis Date     Benign heart murmur 2006     Chronic fatigue      Depression, major      Diagnostic skin and sensitization tests 13 IgE tests all NEGATIVE for pollens, molds, dust mites, and pets.      Fibroids 09     CHEYENNE (generalized anxiety disorder)     Psychiatry, Dr. Navarro     Hypertension      Hypothyroid     hashimotos     IBS (irritable bowel syndrome)      celiac labs NEGATIVE     Intermittent asthma     cold weather     Keratosis pilaris      Lactose intolerance      Lateral epicondylitis of right elbow 2017     LGSIL (low grade squamous intraepithelial dysplasia) 2002     Migraine headaches     menstrual trigger, aggravated by OCPs     Nonallergic rhinitis     13 IgE tests all NEGATIVE for pollens, molds, dust mites, and pets.      Panic attacks      PVC's (premature ventricular contractions) 2006     Vitamin D deficiency        Past Surgical History:   Procedure Laterality Date     CRYOTHERAPY, CERVICAL         Family History   Problem Relation Age of Onset     DIABETES Maternal Grandfather      CANCER Maternal Grandfather      brain     Cardiovascular Maternal Grandmother      carotid stenosis, CHF     Hypertension Maternal Grandmother      Lipids Brother      Allergies Brother      Hypertension Mother      Allergies Mother      Arthritis Mother      Other - See Comments Mother      Mangioma removed 2015     DIABETES Father       C.A.D. Father      CABG, stents, 52     HEART DISEASE Father      Alcohol/Drug Paternal Grandfather      Thyroid Disease Maternal Aunt      CANCER Other 41     uterine      CANCER Maternal Aunt      ov     CANCER Other      maternal cousin       Social History     Social History     Marital status: Significant other     Spouse name: Arnold     Number of children: 2     Years of education: 12     Occupational History     [employer], admin [employer]     Social History Main Topics     Smoking status: Former Smoker     Packs/day: 0.50     Years: 10.00     Types: Cigarettes     Quit date: 8/18/2002     Smokeless tobacco: Former User     Alcohol use No     Drug use: No     Sexual activity: Yes     Partners: Male     Birth control/ protection: Condom     Other Topics Concern     Parent/Sibling W/ Cabg, Mi Or Angioplasty Before 65f 55m? No     Social History Narrative         Current Outpatient Prescriptions:      norethindrone (MICRONOR) 0.35 MG per tablet, Take 1 tablet (0.35 mg) by mouth daily, Disp: 84 tablet, Rfl: 3     VENTOLIN  (90 BASE) MCG/ACT Inhaler, INHALE 2 PUFFS INTO THE LUNGS EVERY 6 HOURS AS NEEDED FOR SHORTNESS OF BREATH, Disp: 18 g, Rfl: 3     order for DME, Equipment being ordered: elbow arm band, Disp: 1 Device, Rfl: 0     SUMAtriptan (IMITREX) 100 MG tablet, Take 1 tablet (100 mg) by mouth at onset of headache for migraine May repeat in 2 hours if needed: max 2/day;, Disp: 9 tablet, Rfl: 11     cyclobenzaprine (FLEXERIL) 10 MG tablet, Take 1 tablet (10 mg) by mouth 3 times daily as needed for muscle spasms, Disp: 90 tablet, Rfl: 1     UNABLE TO FIND, MEDICATION NAME: T4 95 MCG/T3 12.9 MCG capsule - take one capsule by mouth in the morning on empty stomach, Disp: 1 each, Rfl: 0     ALPRAZolam (ALPRAZOLAM XR) 1 MG 24 hr tablet, Take 1 tablet (1 mg) by mouth 2 times daily Prescribed by psychiatry, Disp: 1 tablet, Rfl: 0     Progesterone Micronized (PROGESTERONE PO), Take 200 mg by  "mouth Take one capsule by mouth at bedtime on days 10-28 of cycle. Stop if period starts., Disp: , Rfl:      UNABLE TO FIND, MEDICATION NAME: Triiodo-L-Thyroninr (T3) SR 5 mcg capsule - take one capsule by mouth in earlyafternoon, Disp: , Rfl:      order for DME, Magnesium chloride 12.5% solution.  Take 2-4 tablespoons daily.  Mix in juice or water before taking.  Keep in refrigerator., Disp: 960 mL, Rfl: 6     cholecalciferol (VITAMIN D3) 5000 UNITS CAPS capsule, Take by mouth daily, Disp: , Rfl:      mometasone (NASONEX) 50 MCG/ACT nasal spray, Spray 2 sprays into both nostrils daily., Disp: , Rfl:      Cod Liver Oil 1250-135 UNITS CAPS, Take  by mouth., Disp: , Rfl:      B Complex Vitamins (B COMPLEX PO), Take  by mouth., Disp: , Rfl:      Probiotic Product (PROBIOTIC PO), Take  by mouth., Disp: , Rfl:      [DISCONTINUED] ORDER FOR DME, Magnesium chloride 12.5% solution.  Take 2-4 tablespoons daily.  Mix in juice or water before taking.  Keep in refrigerator., Disp: 480 mL, Rfl: 6    Allergies   Allergen Reactions     Estrogens [Conjugated Estrogens]      Migraine headaches     Milk Protein GI Disturbance     IgE to milk NEGATIVE in  per [facility]      No Clinical Screening - See Comments      MRI contrast     Gluten Fatigue, Rash and GI Disturbance      celiac labs NEGATIVE       Exam:  Vitals:    17 1527   BP: 114/62   Temp: 98.3  F (36.8  C)   TempSrc: Oral   Weight: 165 lb 3.2 oz (74.9 kg)   Height: 5' 4\" (1.626 m)     Body mass index is 28.36 kg/(m^2).     Exam:  Constitutional: healthy, alert, no distress, over weight but not obese  Psychiatric: mentation appears normal and affect normal/bright      A/P:  41 year old  with contraceptive consult.  - Discussed options: Pills, patch, ring, Nexplanon, depo Provera, IUDs, barriers.   - Discussed methods, side effects, efficacy, bleeding profile.   - Discussed pill use in detail, missed pills  - Not a candidate for combined oral " contraceptive pills due to migraines. Discussed progesterone only pill, how to take, how there is no withdrawal week. Discussed importance of taking at the same time every day. Discussed side effects.   - Concerned about potential for regular or unpredictable bleeding with Nexplanon. Potential for weight gain with Depo Provera. Poor prior experience with IUD. Not interested in surgical methods at this time.    Greater than 50% of this 25 minute appointment was spent in counseling on contraception.

## 2017-08-07 ENCOUNTER — TRANSFERRED RECORDS (OUTPATIENT)
Dept: HEALTH INFORMATION MANAGEMENT | Facility: CLINIC | Age: 42
End: 2017-08-07

## 2017-08-10 ENCOUNTER — TELEPHONE (OUTPATIENT)
Dept: FAMILY MEDICINE | Facility: CLINIC | Age: 42
End: 2017-08-10

## 2017-08-10 NOTE — TELEPHONE ENCOUNTER
Reason for call:  Patient reporting a symptom    Symptom or request: patient just recently lost 100 lbs and she is now feeling / racing heart feeling, lightheadness    Duration (how long have symptoms been present): about 3 weeks    Have you been treated for this before? No    Additional comments: Patient was hoping to see Dr Black before waiting over 1 month    Phone Number patient can be reached at:  Home number on file [phone] (home)    Best Time:  any    Can we leave a detailed message on this number:  YES    Call taken on 8/10/2017 at 11:57 AM by Maddy Contreras

## 2017-08-10 NOTE — TELEPHONE ENCOUNTER
"FYI to PCP per patient's request- Patient transferred as a red flag due to a racing heart. BPs good but heart rate variable- 40s-135. She occasionally feels woozy, dizzy & feels a weird dissociative feeling.   She denies SOB, chest, neck or arm pain, faintness or sweating.    Patient states this has been happening since May 28th. She was coming home from a friends house, history of panic attacks but well controlled on xanax. FQ=255, took xanax, hands & legs shaking. Happened again a few weeks later but was able to calm herself with deep breathing.  Patient states she has Hishimotos & her endo MD is out of the office & she's frustrated that another provider isn't interested in further testing. Labs okay recently so she stopped her meds on Sunday after halfing her pills for 3 weeks.  Took a half of beta blocker but then her heart rate dropped & she didn't feel well. \"It's a cycle. I don't want to be alone because I'm scared it's going to happen again.\"  Recommended ER per protocol but patient prefers [facility] & agrees to go there now. She will request records be sent to us.   Maki Abdalla RN    "

## 2017-08-12 NOTE — TELEPHONE ENCOUNTER
Please follow up to determine if she needs to have any follow up planned.  Huddle with me.  Fiona Black MD

## 2017-08-14 RX ORDER — METOPROLOL TARTRATE 25 MG/1
25 TABLET, FILM COATED ORAL 2 TIMES DAILY
COMMUNITY
Start: 2017-08-14 | End: 2017-10-09 | Stop reason: SINTOL

## 2017-08-14 NOTE — TELEPHONE ENCOUNTER
Patient states her heart rate is still variable but it is fine. She is taking metoprolol as atenolol is on back order. She denies needs or an appointment at this time.  Patient states she had a anxiety reaction to peptide powder. She will notify her thyroid doctor.  Maki Abdalla RN

## 2017-08-15 ENCOUNTER — RECORDS - HEALTHEAST (OUTPATIENT)
Dept: ADMINISTRATIVE | Facility: OTHER | Age: 42
End: 2017-08-15

## 2017-08-15 ENCOUNTER — TELEPHONE (OUTPATIENT)
Dept: FAMILY MEDICINE | Facility: CLINIC | Age: 42
End: 2017-08-15

## 2017-08-15 NOTE — TELEPHONE ENCOUNTER
Reason for call:  Patient reporting a symptom    Symptom or request: heart palpitations     Additional comments: ISMAEL Downey triaging patient.    Phone Number patient can be reached at:  Home number on file [phone] (home)        Call taken on 8/15/2017 at 10:48 AM by Bushra Worthy

## 2017-08-15 NOTE — TELEPHONE ENCOUNTER
Called patient and she is going to request medical records from Dr Gilma Mohr to be faxed to Dr Black attention.    Dr Gilma Mohr had wanted patient to go to [facility] or [facility] for the Holter moniter.  Patient cannot get there because she cannot drive on freeway because of her dizziness.    Patient would like to come to Dr Black for the Holter monitor.     I looked at Dr Black schedule and could not find anything.      Will huddle with Dr Black tomorrow to see what she wants to do.    Told patient I would call her back tomorrow.    Maddy Contreras

## 2017-08-15 NOTE — TELEPHONE ENCOUNTER
Needs an appointment for holter monitor evaluation and order.  Would be helpful to have notes from Dr. Gilma Mohr at some point as well.  Fiona Black MD

## 2017-08-15 NOTE — TELEPHONE ENCOUNTER
: 1975  PHONE #'s: [phone] (home)     PRESENTING PROBLEM: has hashimoto disease - went from hypothyroid to hyperthyroid due to 97 lb weight loss - needs Holter monitor per  Dr. nigel Mohr - no new symptoms  Today just wants order for Holter through PCP Dr. Black.     Routing to PCP to review and advise.    Nasreen Marcano RN  [facility]

## 2017-08-16 NOTE — TELEPHONE ENCOUNTER
Huddled with Dr Black and she said to schedule patient when she comes back into clinic week of 8/28.    Called patient and scheduled her on 08/30/2017.     Patient is feeling better because she went to urgent care and had an EKG done and it was normal.    Maddy Contreras

## 2017-08-25 ENCOUNTER — TRANSFERRED RECORDS (OUTPATIENT)
Dept: HEALTH INFORMATION MANAGEMENT | Facility: CLINIC | Age: 42
End: 2017-08-25

## 2017-08-25 LAB — EJECTION FRACTION: 55

## 2017-08-30 ENCOUNTER — OFFICE VISIT (OUTPATIENT)
Dept: FAMILY MEDICINE | Facility: CLINIC | Age: 42
End: 2017-08-30
Payer: COMMERCIAL

## 2017-08-30 VITALS
SYSTOLIC BLOOD PRESSURE: 130 MMHG | DIASTOLIC BLOOD PRESSURE: 76 MMHG | TEMPERATURE: 98 F | WEIGHT: 163.25 LBS | HEIGHT: 64 IN | OXYGEN SATURATION: 99 % | BODY MASS INDEX: 27.87 KG/M2 | HEART RATE: 83 BPM

## 2017-08-30 DIAGNOSIS — D50.9 IRON DEFICIENCY ANEMIA, UNSPECIFIED IRON DEFICIENCY ANEMIA TYPE: ICD-10-CM

## 2017-08-30 DIAGNOSIS — R00.2 PALPITATIONS: ICD-10-CM

## 2017-08-30 DIAGNOSIS — F41.9 ANXIETY: ICD-10-CM

## 2017-08-30 DIAGNOSIS — R79.89 ELEVATED LFTS: ICD-10-CM

## 2017-08-30 DIAGNOSIS — E03.8 OTHER SPECIFIED HYPOTHYROIDISM: Primary | ICD-10-CM

## 2017-08-30 DIAGNOSIS — R63.4 LOSS OF WEIGHT: ICD-10-CM

## 2017-08-30 LAB
BASOPHILS # BLD AUTO: 0 10E9/L (ref 0–0.2)
BASOPHILS NFR BLD AUTO: 0.2 %
CRP SERPL-MCNC: <2.9 MG/L (ref 0–8)
DIFFERENTIAL METHOD BLD: ABNORMAL
EOSINOPHIL # BLD AUTO: 0.1 10E9/L (ref 0–0.7)
EOSINOPHIL NFR BLD AUTO: 1.1 %
ERYTHROCYTE [DISTWIDTH] IN BLOOD BY AUTOMATED COUNT: 14 % (ref 10–15)
ERYTHROCYTE [SEDIMENTATION RATE] IN BLOOD BY WESTERGREN METHOD: 10 MM/H (ref 0–20)
HCT VFR BLD AUTO: 45 % (ref 35–47)
HGB BLD-MCNC: 15.1 G/DL (ref 11.7–15.7)
LYMPHOCYTES # BLD AUTO: 1.5 10E9/L (ref 0.8–5.3)
LYMPHOCYTES NFR BLD AUTO: 28.1 %
MCH RBC QN AUTO: 33.3 PG (ref 26.5–33)
MCHC RBC AUTO-ENTMCNC: 33.6 G/DL (ref 31.5–36.5)
MCV RBC AUTO: 99 FL (ref 78–100)
MONOCYTES # BLD AUTO: 0.2 10E9/L (ref 0–1.3)
MONOCYTES NFR BLD AUTO: 4.4 %
NEUTROPHILS # BLD AUTO: 3.6 10E9/L (ref 1.6–8.3)
NEUTROPHILS NFR BLD AUTO: 66.2 %
PLATELET # BLD AUTO: 187 10E9/L (ref 150–450)
RBC # BLD AUTO: 4.53 10E12/L (ref 3.8–5.2)
T3FREE SERPL-MCNC: 2.1 PG/ML (ref 2.3–4.2)
VIT B12 SERPL-MCNC: 898 PG/ML (ref 193–986)
WBC # BLD AUTO: 5.4 10E9/L (ref 4–11)

## 2017-08-30 PROCEDURE — 86800 THYROGLOBULIN ANTIBODY: CPT | Mod: 90 | Performed by: FAMILY MEDICINE

## 2017-08-30 PROCEDURE — 84439 ASSAY OF FREE THYROXINE: CPT | Performed by: FAMILY MEDICINE

## 2017-08-30 PROCEDURE — 82728 ASSAY OF FERRITIN: CPT | Performed by: FAMILY MEDICINE

## 2017-08-30 PROCEDURE — 83550 IRON BINDING TEST: CPT | Performed by: FAMILY MEDICINE

## 2017-08-30 PROCEDURE — 86140 C-REACTIVE PROTEIN: CPT | Performed by: FAMILY MEDICINE

## 2017-08-30 PROCEDURE — 80050 GENERAL HEALTH PANEL: CPT | Performed by: FAMILY MEDICINE

## 2017-08-30 PROCEDURE — 83735 ASSAY OF MAGNESIUM: CPT | Performed by: FAMILY MEDICINE

## 2017-08-30 PROCEDURE — 86376 MICROSOMAL ANTIBODY EACH: CPT | Performed by: FAMILY MEDICINE

## 2017-08-30 PROCEDURE — 99214 OFFICE O/P EST MOD 30 MIN: CPT | Performed by: FAMILY MEDICINE

## 2017-08-30 PROCEDURE — 99000 SPECIMEN HANDLING OFFICE-LAB: CPT | Performed by: FAMILY MEDICINE

## 2017-08-30 PROCEDURE — 82024 ASSAY OF ACTH: CPT | Performed by: FAMILY MEDICINE

## 2017-08-30 PROCEDURE — 00000344 ZZHCL STATISTIC REMEASURE THYROGLOBULIN: Mod: 90 | Performed by: FAMILY MEDICINE

## 2017-08-30 PROCEDURE — 84432 ASSAY OF THYROGLOBULIN: CPT | Mod: 90 | Performed by: FAMILY MEDICINE

## 2017-08-30 PROCEDURE — 84481 FREE ASSAY (FT-3): CPT | Performed by: FAMILY MEDICINE

## 2017-08-30 PROCEDURE — 83540 ASSAY OF IRON: CPT | Performed by: FAMILY MEDICINE

## 2017-08-30 PROCEDURE — 36415 COLL VENOUS BLD VENIPUNCTURE: CPT | Performed by: FAMILY MEDICINE

## 2017-08-30 PROCEDURE — 85652 RBC SED RATE AUTOMATED: CPT | Performed by: FAMILY MEDICINE

## 2017-08-30 PROCEDURE — 83520 IMMUNOASSAY QUANT NOS NONAB: CPT | Mod: 90 | Performed by: FAMILY MEDICINE

## 2017-08-30 PROCEDURE — 84445 ASSAY OF TSI GLOBULIN: CPT | Mod: 90 | Performed by: FAMILY MEDICINE

## 2017-08-30 PROCEDURE — 82607 VITAMIN B-12: CPT | Performed by: FAMILY MEDICINE

## 2017-08-30 NOTE — MR AVS SNAPSHOT
After Visit Summary   8/30/2017    Johnna Severson    MRN: [id number]           Patient Information     Date Of Birth          1975        Visit Information        Provider Department      8/30/2017 12:00 PM Fiona Black MD [facility]        Today's Diagnoses     Other specified hypothyroidism    -  1    Palpitations        Anxiety        Elevated LFTs        Loss of weight        Iron deficiency anemia, unspecified iron deficiency anemia type          Care Instructions    [facility]   Discharged by : Reena YE MA    If you have any questions regarding your visit please contact your care team:     [facility] Clinic Hours Telephone Number   Dr. Gemini Muniz   7am-7pm Monday - Thursday   7am-5pm Fridays  [phone]   (Appointment scheduling available 24/7)   RN Line   [phone] option 2       For a Price Quote for your services, please call our Consumer Price Line at [phone].     What options do I have for visits at the clinic other than the traditional office visit?     To expand how we care for you, many of our providers are utilizing electronic visits (e-visits) and telephone visits, when medically appropriate, for interactions with their patients rather than a visit in the clinic. We also offer nurse visits for many medical concerns. Just like any other service, we will bill your insurance company for this type of visit based on time spent on the phone with your provider. Not all insurance companies cover these visits. Please check with your medical insurance if this type of visit is covered. You will be responsible for any charges that are not paid by your insurance.   E-visits via [employer]: generally incur a $35.00 fee.     Telephone visits:   Time spent on the phone: *charged based on time that is spent on the phone in increments of 10 minutes. Estimated cost:   5-10 mins  $30.00   11-20 mins. $59.00   21-30 mins. $85.00     Use Parrablet (secure email communication and access to your chart) to send your primary care provider a message or make an appointment. Ask someone on your Team how to sign up for [employer].     As always, Thank you for trusting us with your health care needs!      [facility]:    Scheduling Appointments  [facility]  Call: [phone]    [facility]  Call: [phone]    [facility]  Call: [phone]      WHERE TO GO FOR CARE?    Clinic    Make an appointment if you:       Are sick (cold, cough, flu, sore throat, earache or in pain).       Have a small injury (sprain, small cut, burn or broken bone).       Need a physical exam, Pap smear, vaccine or prescription refill.       Have questions about your health or medicines.    To reach us:      Call [phone] ([phone]). Open 24 hours every day. (For counseling services, call [phone].)    Log into [employer] at [email or web address]. (Visit [email or web address] to create an account.) Hospital emergency room    An emergency is a serious or life- threatening problem that must be treated right away.    Call [phone] or get to the hospital if you have:      Very bad or sudden:            - Chest pain or pressure         - Bleeding         - Head or belly pain         - Dizziness or trouble seeing, walking or                          Speaking      Problems breathing      Blood in your vomit or you are coughing up blood      A major injury (knocked out, loss of a finger or limb, rape, broken bone protruding from skin)    A mental health crisis. (Or call the Mental Health Crisis line at 1-605.676.4439 or Suicide Prevention Hotline at 1-743.873.6284.)    Open 24 hours every day. You don't need an appointment.     Urgent care    Visit urgent care for sickness or small injuries when the clinic is closed. You don't need an  appointment. To check hours or find an urgent care near you, visit [email or web address]. Online care    Get online care from [facility] for more than 70 common problems, like colds, allergies and infections. Open 24 hours every day at:   [email or web address]   Need help deciding?    For advice about where to be seen, you may call your clinic and ask to speak with a nurse. We're here for you 24 hours every day.         If you are deaf or hard of hearing, please let us know. We provide many free services including sign language interpreters, oral interpreters, TTYs, telephone amplifiers, note takers and written materials.                         Follow-ups after your visit        Who to contact     If you have questions or need follow up information about today's clinic visit or your schedule please contact [facility] directly at [phone].  Normal or non-critical lab and imaging results will be communicated to you by MyChart, letter or phone within 4 business days after the clinic has received the results. If you do not hear from us within 7 days, please contact the clinic through GrabInboxhart or phone. If you have a critical or abnormal lab result, we will notify you by phone as soon as possible.  Submit refill requests through [employer] or call your pharmacy and they will forward the refill request to us. Please allow 3 business days for your refill to be completed.          Additional Information About Your Visit        Gr[employer]Inbox[employer]Movellas Information     [employer] Systems gives you secure access to your electronic health record. If you see a primary care provider, you can also send messages to your care team and make appointments. If you have questions, please call your primary care clinic.  If you do not have a primary care provider, please call [phone] and they will assist you.        Care EveryWhere ID     This is your Care EveryWhere ID. This could be used by other organizations to access your [facility]  "records  [id number]        Your Vitals Were     Pulse Temperature Height Last Period Pulse Oximetry BMI (Body Mass Index)    83 98  F (36.7  C) (Oral) 5' 4\" (1.626 m) 08/28/2017 99% 28.02 kg/m2       Blood Pressure from Last 3 Encounters:   08/30/17 130/76   07/20/17 114/62   05/26/17 118/70    Weight from Last 3 Encounters:   08/30/17 163 lb 4 oz (74 kg)   07/20/17 165 lb 3.2 oz (74.9 kg)   05/26/17 176 lb (79.8 kg)              We Performed the Following     Adrenal corticotropin     CBC with platelets differential     Comprehensive metabolic panel (BMP + Alb, Alk Phos, ALT, AST, Total. Bili, TP)     CRP, inflammation     ESR: Erythrocyte sedimentation rate     Ferritin     Iron and iron binding capacity     Magnesium     T3, Free     T4, free     Thyroglobulin and antibody     Thyroid peroxidase antibody     Thyroid stimulating immunoglobulin     Thyrotropin Receptor Antibody     TSH     Vitamin B12          Today's Medication Changes          These changes are accurate as of: 8/30/17  1:20 PM.  If you have any questions, ask your nurse or doctor.               These medicines have changed or have updated prescriptions.        Dose/Directions    order for DME   This may have changed:  Another medication with the same name was removed. Continue taking this medication, and follow the directions you see here.   Used for:  Chronic migraine without aura without status migrainosus, not intractable   Changed by:  Fiona Black MD        Magnesium chloride 12.5% solution.  Take 2-4 tablespoons daily.  Mix in juice or water before taking.  Keep in refrigerator.   Quantity:  960 mL   Refills:  6                Primary Care Provider Office Phone # Fax #    Fiona Black [phone] [address] [facility] [address]        Equal Access to Services     MADISYN RECINOS AH: Chuck Davidson, edie xie, qaybta janine shrestha " ah. So [facility] [phone].    ATENCIÓN: Si sher price, tiene a montaño disposición servicios gratuitos de asistencia lingüística. Vipul faulkner [phone].    We comply with applicable federal civil rights laws and [place] laws. We do not discriminate on the basis of race, color, national origin, age, disability sex, sexual orientation or gender identity.            Thank you!     Thank you for choosing [facility] [place]  for your care. Our goal is always to provide you with excellent care. Hearing back from our patients is one way we can continue to improve our services. Please take a few minutes to complete the written survey that you may receive in the mail after your visit with us. Thank you!             Your Updated Medication List - Protect others around you: Learn how to safely use, store and throw away your medicines at www.disposemymeds.org.          This list is accurate as of: 8/30/17  1:20 PM.  Always use your most recent med list.                   Brand Name Dispense Instructions for use Diagnosis    ALPRAZOLAM XR 1 MG 24 hr tablet   Generic drug:  ALPRAZolam     1 tablet    Take 1 tablet (1 mg) by mouth 2 times daily Prescribed by psychiatry    CHEYENNE (generalized anxiety disorder), Panic disorder       B COMPLEX PO      Take  by mouth.        cholecalciferol 5000 UNITS Caps capsule    vitamin D3     Take by mouth daily        Cod Liver Oil 1250-135 UNITS Caps      Take  by mouth.        cyclobenzaprine 10 MG tablet    FLEXERIL    90 tablet    Take 1 tablet (10 mg) by mouth 3 times daily as needed for muscle spasms    Fibromyalgia       metoprolol 25 MG tablet    LOPRESSOR     Take 1 tablet (25 mg) by mouth 2 times daily        NASONEX 50 MCG/ACT spray   Generic drug:  mometasone      Spray 2 sprays into both nostrils daily.        norethindrone 0.35 MG per tablet    MICRONOR    84 tablet    Take 1 tablet (0.35 mg) by mouth daily    General counseling for prescription of oral contraceptives        order for DME     960 mL    Magnesium chloride 12.5% solution.  Take 2-4 tablespoons daily.  Mix in juice or water before taking.  Keep in refrigerator.    Chronic migraine without aura without status migrainosus, not intractable       PROBIOTIC PO      Take  by mouth.        PROGESTERONE PO      Take 200 mg by mouth Take one capsule by mouth at bedtime on days 10-28 of cycle. Stop if period starts.        SUMAtriptan 100 MG tablet    IMITREX    9 tablet    Take 1 tablet (100 mg) by mouth at onset of headache for migraine May repeat in 2 hours if needed: max 2/day;    Migraine with aura and without status migrainosus, not intractable       * UNABLE TO FIND      MEDICATION NAME: Triiodo-L-Thyroninr (T3) SR 5 mcg capsule - take one capsule by mouth in earlyafternoon        * UNABLE TO FIND     1 each    MEDICATION NAME: T4 95 MCG/T3 12.9 MCG capsule - take one capsule by mouth in the morning on empty stomach        VENTOLIN  (90 BASE) MCG/ACT Inhaler   Generic drug:  albuterol     18 g    INHALE 2 PUFFS INTO THE LUNGS EVERY 6 HOURS AS NEEDED FOR SHORTNESS OF BREATH    Intermittent asthma, uncomplicated       * Notice:  This list has 2 medication(s) that are the same as other medications prescribed for you. Read the directions carefully, and ask your doctor or other care provider to review them with you.

## 2017-08-30 NOTE — PROGRESS NOTES
"  SUBJECTIVE:   Johnna Severson is a 42 year old female who presents to clinic today for the following health issues:      ED/UC Followup:    Facility:  [facility]  Date of visit: 8/16/17  Reason for visit: heart palpitations and dizziness  Current Status: Same, racing heart, palms sweat, body temp rises.  Feels like panic attack but it's not.  Takes xanax at times.  No thyroid meds since August 5.    FYI to PCP per patient's request- Patient transferred as a red flag due to a racing heart. BPs good but heart rate variable- 40s-135. She occasionally feels woozy, dizzy & feels a weird dissociative feeling.   She denies SOB, chest, neck or arm pain, faintness or sweating.     RN note copied from 8/10 from phone encounter:   <<Patient states this has been happening since May 28th. She was coming home from a friends house, history of panic attacks but well controlled on xanax. JZ=510, took xanax, hands & legs shaking. Happened again a few weeks later but was able to calm herself with deep breathing.  Patient states she has Hashimotos & her endo MD is out of the office & she's frustrated that another provider isn't interested in further testing. Labs okay recently so she stopped her meds on Sunday after halfing her pills for 3 weeks.  Took a half of beta blocker but then her heart rate dropped & she didn't feel well. \"It's a cycle. I don't want to be alone because I'm scared it's going to happen again.\"  Recommended ER per protocol but patient prefers [facility] & agrees to go there now. She will request records be sent to us.   Maki Abdalla, ISMAEL >>      Reports experiencing frequent episodes of raising hear rate and feeling hyperactive for the past few months   She has hx of Hashimoto's disease  Dosing of medications were decreased at the end of May including oral iron  A few days after the change she was driving her car and suddenly felt palpitations and was visibly shaking   HR " was up to 180s  Was unsure if it was a panic attack or thyroid imbalance   Took half tablet of xanax which helped and HR went back to normal  After this, episodes became intermittent and were happening almost on weekly basis   Had labs done on May 28th and reports they were normal   Rechecked labs in 6 weeks which were still normal   In June, things got better and spent about 1 month without any symptoms   In July, symptoms recurred, this time more frequent, and in addition developed continuous dizziness   She does not feel like dizziness was related to vertigo  Notes it feels similar to the dizziness she felt back when she was diagnosed with thyroid disease   At some point was seen at [facility] and started on metoprolol XR  She didn't tolerate and was then changed to metoprolol 25 mg b.i.d in June  With this new dose, her HR went down to the 50's and felt lightheaded so she discontinued   Woke up next morning hyper again   She then followed with Gilma Mohr who recommended Holter monitor  On August 5th all her medications were discontinued   The plan was to wait about 4 weeks to get baseline labs   But symptoms recurred and on August 16th she decided to [facility]  Had normal EKG and normal labs at the time     Today reports feeling slightly better but still hyper  Still off of all thyroid medications but takes Xanax occasionally  Has not had tachycardia episodes for a few weeks   Notes some muscle weakness and occasional cramping   Has been loosing some weight but mostly stable   Appetite is normal, eats on average 50-70g of carbs a day, 1,500 calories/day  Reviewed paperwork faxed in by her thyroid specialist and labs     She brought in a list of several labs that she is requesting    We reviewed and discussed these tests   She came up with this list after researching online and given recent diagnosis of pituitary tumor in the family       Problem list and histories reviewed &  adjusted, as indicated.  Additional history: as documented    Patient Active Problem List   Diagnosis     Depression, major     Migraine headache     CHEYENNE (generalized anxiety disorder)     CARDIOVASCULAR SCREENING; LDL GOAL LESS THAN 160     Intermittent asthma     Vitamin D deficiency     Elevated LFTs     Hypertension goal BP (blood pressure) < 140/90     Obesity     Encounter for IUD insertion     Zinc deficiency     IBS (irritable bowel syndrome)     Lactose intolerance     Nonallergic rhinitis     Diagnostic skin and sensitization tests     Keratosis pilaris     Other specified hypothyroidism     Fibromyalgia     Migraine with aura and without status migrainosus, not intractable     overweight with BMI >40     Past Surgical History:   Procedure Laterality Date     CRYOTHERAPY, CERVICAL  2002       Social History   Substance Use Topics     Smoking status: Former Smoker     Packs/day: 0.50     Years: 10.00     Types: Cigarettes     Quit date: 8/18/2002     Smokeless tobacco: Former User     Alcohol use No     Family History   Problem Relation Age of Onset     DIABETES Maternal Grandfather      CANCER Maternal Grandfather      brain     Cardiovascular Maternal Grandmother      carotid stenosis, CHF     Hypertension Maternal Grandmother      Lipids Brother      Allergies Brother      Hypertension Mother      Allergies Mother      Arthritis Mother      Other - See Comments Mother      Mangioma removed Sept 2015     DIABETES Father      C.A.D. Father      CABG, stents, 52     HEART DISEASE Father      Alcohol/Drug Paternal Grandfather      Thyroid Disease Maternal Aunt      CANCER Other 41     uterine      CANCER Maternal Aunt      ov     CANCER Other      maternal cousin         Current Outpatient Prescriptions   Medication Sig Dispense Refill     norethindrone (MICRONOR) 0.35 MG per tablet Take 1 tablet (0.35 mg) by mouth daily 84 tablet 3     VENTOLIN  (90 BASE) MCG/ACT Inhaler INHALE 2 PUFFS INTO THE LUNGS  EVERY 6 HOURS AS NEEDED FOR SHORTNESS OF BREATH 18 g 3     SUMAtriptan (IMITREX) 100 MG tablet Take 1 tablet (100 mg) by mouth at onset of headache for migraine May repeat in 2 hours if needed: max 2/day; 9 tablet 11     cyclobenzaprine (FLEXERIL) 10 MG tablet Take 1 tablet (10 mg) by mouth 3 times daily as needed for muscle spasms 90 tablet 1     ALPRAZolam (ALPRAZOLAM XR) 1 MG 24 hr tablet Take 1 tablet (1 mg) by mouth 2 times daily Prescribed by psychiatry 1 tablet 0     Progesterone Micronized (PROGESTERONE PO) Take 200 mg by mouth Take one capsule by mouth at bedtime on days 10-28 of cycle. Stop if period starts.       order for DME Magnesium chloride 12.5% solution.  Take 2-4 tablespoons daily.  Mix in juice or water before taking.  Keep in refrigerator. 960 mL 6     cholecalciferol (VITAMIN D3) 5000 UNITS CAPS capsule Take by mouth daily       mometasone (NASONEX) 50 MCG/ACT nasal spray Spray 2 sprays into both nostrils daily.       B Complex Vitamins (B COMPLEX PO) Take  by mouth.       Probiotic Product (PROBIOTIC PO) Take  by mouth.       metoprolol (LOPRESSOR) 25 MG tablet Take 1 tablet (25 mg) by mouth 2 times daily       UNABLE TO FIND MEDICATION NAME: T4 95 MCG/T3 12.9 MCG capsule - take one capsule by mouth in the morning on empty stomach 1 each 0     UNABLE TO FIND MEDICATION NAME: Triiodo-L-Thyroninr (T3) SR 5 mcg capsule - take one capsule by mouth in earlyafternoon       [DISCONTINUED] ORDER FOR DME Magnesium chloride 12.5% solution.  Take 2-4 tablespoons daily.  Mix in juice or water before taking.  Keep in refrigerator. 480 mL 6     Cod Liver Oil 1250-135 UNITS CAPS Take  by mouth.       Allergies   Allergen Reactions     Estrogens [Conjugated Estrogens]      Migraine headaches     Milk Protein GI Disturbance     IgE to milk NEGATIVE in 11/12 per [facility]      No Clinical Screening - See Comments      MRI contrast     Gluten Fatigue, Rash and GI Disturbance     5/11 celiac labs NEGATIVE  "    Labs reviewed in EPIC      Reviewed and updated as needed this visit by clinical staffTobacco  Allergies  Med Hx  Surg Hx  Fam Hx  Soc Hx      Reviewed and updated as needed this visit by Provider         ROS:  Constitutional, neuro, ENT, endocrine, pulmonary, cardiac, gastrointestinal, genitourinary, musculoskeletal, integument and psychiatric systems are negative, except as otherwise noted.    This document serves as a record of the services and decisions personally performed and made by Fiona Black MD. It was created on her behalf by Lainey Kong, a trained medical scribe. The creation of this document is based the provider's statements to the medical scribe.    Scribmina Kong 12:19 PM, August 30, 2017    OBJECTIVE:                                                    /76 (BP Location: Right arm, Patient Position: Sitting, Cuff Size: Adult Regular)  Pulse 83  Temp 98  F (36.7  C) (Oral)  Ht 5' 4\" (1.626 m)  Wt 163 lb 4 oz (74 kg)  LMP 08/28/2017  SpO2 99%  BMI 28.02 kg/m2  Body mass index is 28.02 kg/(m^2).  GENERAL APPEARANCE: alert, no distress and anxious   NECK: no adenopathy, no asymmetry, masses, or scars and thyroid normal to palpation  RESP: lungs clear to auscultation - no rales, rhonchi or wheezes  CV: regular rates and rhythm, normal S1 S2, no S3 or S4 and no murmur, click or rub  ABDOMEN: soft, nontender, without hepatosplenomegaly or masses and bowel sounds normal  SKIN: no suspicious lesions or rashes  PSYCH: mentation appears normal but fast speech and affect normal/bright       ASSESSMENT/PLAN:                                                    Annita was seen today for er f/u.    Diagnoses and all orders for this visit:    She brings in specific labs she would like evaluated.  She has now been off her thyroid medication for a few weeks, which is prescribed by a different provider.  She is wondering if she as been hyperthyroid in the recent weeks.  She is " unclear who she is going to continue to see for her thyroid disease.  Will start work up to determine if there are other explanations for her symptoms.  May need to consider holter monitor.    Other specified hypothyroidism  -     T4, free  -     T3, Free  -     TSH  -     Thyroid peroxidase antibody  -     Thyroid stimulating immunoglobulin  -     CBC with platelets differential  -     Ferritin  -     Iron and iron binding capacity  -     Vitamin B12  -     Comprehensive metabolic panel (BMP + Alb, Alk Phos, ALT, AST, Total. Bili, TP)  -     Magnesium  -     ESR: Erythrocyte sedimentation rate  -     CRP, inflammation  -     Adrenal corticotropin  -     Thyroglobulin and antibody  -     Thyrotropin Receptor Antibody    Palpitations  -     T4, free  -     T3, Free  -     TSH  -     Thyroid peroxidase antibody  -     Thyroid stimulating immunoglobulin  -     CBC with platelets differential  -     Ferritin  -     Iron and iron binding capacity  -     Vitamin B12  -     Comprehensive metabolic panel (BMP + Alb, Alk Phos, ALT, AST, Total. Bili, TP)  -     Magnesium  -     ESR: Erythrocyte sedimentation rate  -     CRP, inflammation  -     Adrenal corticotropin  -     Thyroglobulin and antibody  -     Thyrotropin Receptor Antibody    May need to consider holter monitor.  She is off beta blockers.    Anxiety  -     T4, free  -     T3, Free  -     TSH  -     Thyroid peroxidase antibody  -     Thyroid stimulating immunoglobulin  -     CBC with platelets differential  -     Ferritin  -     Iron and iron binding capacity  -     Vitamin B12  -     Comprehensive metabolic panel (BMP + Alb, Alk Phos, ALT, AST, Total. Bili, TP)  -     Magnesium  -     ESR: Erythrocyte sedimentation rate  -     CRP, inflammation  -     Adrenal corticotropin  -     Thyroglobulin and antibody  -     Thyrotropin Receptor Antibody    Elevated LFTs  -     T4, free  -     T3, Free  -     TSH  -     Thyroid peroxidase antibody  -     Thyroid stimulating  immunoglobulin  -     CBC with platelets differential  -     Ferritin  -     Iron and iron binding capacity  -     Vitamin B12  -     Comprehensive metabolic panel (BMP + Alb, Alk Phos, ALT, AST, Total. Bili, TP)  -     Magnesium  -     ESR: Erythrocyte sedimentation rate  -     CRP, inflammation  -     Adrenal corticotropin  -     Thyroglobulin and antibody  -     Thyrotropin Receptor Antibody    Loss of weight  -     T4, free  -     T3, Free  -     TSH  -     Thyroid peroxidase antibody  -     Thyroid stimulating immunoglobulin  -     CBC with platelets differential  -     Ferritin  -     Iron and iron binding capacity  -     Vitamin B12  -     Comprehensive metabolic panel (BMP + Alb, Alk Phos, ALT, AST, Total. Bili, TP)  -     Magnesium  -     ESR: Erythrocyte sedimentation rate  -     CRP, inflammation  -     Adrenal corticotropin  -     Thyroglobulin and antibody  -     Thyrotropin Receptor Antibody    Iron deficiency anemia, unspecified iron deficiency anemia type  -     T4, free  -     T3, Free  -     TSH  -     Thyroid peroxidase antibody  -     Thyroid stimulating immunoglobulin  -     CBC with platelets differential  -     Ferritin  -     Iron and iron binding capacity  -     Vitamin B12  -     Comprehensive metabolic panel (BMP + Alb, Alk Phos, ALT, AST, Total. Bili, TP)  -     Magnesium  -     ESR: Erythrocyte sedimentation rate  -     CRP, inflammation  -     Adrenal corticotropin  -     Thyroglobulin and antibody  -     Thyrotropin Receptor Antibody      The information in this document, created by the medical scribe for me, accurately reflects the services I personally performed and the decisions made by me. I have reviewed and approved this document for accuracy prior to leaving the patient care area.  Fiona Black MD  [facility]

## 2017-08-30 NOTE — PATIENT INSTRUCTIONS
[facility]   Discharged by : Reena YE MA    If you have any questions regarding your visit please contact your care team:     [facility] Clinic Hours Telephone Number   Dr. Gemini Muniz   7am-7pm Monday - Thursday   7am-5pm Fridays  [phone]   (Appointment scheduling available 24/7)   RN Line   [phone] option 2       For a Price Quote for your services, please call our Consumer Price Line at [phone].     What options do I have for visits at the clinic other than the traditional office visit?     To expand how we care for you, many of our providers are utilizing electronic visits (e-visits) and telephone visits, when medically appropriate, for interactions with their patients rather than a visit in the clinic. We also offer nurse visits for many medical concerns. Just like any other service, we will bill your insurance company for this type of visit based on time spent on the phone with your provider. Not all insurance companies cover these visits. Please check with your medical insurance if this type of visit is covered. You will be responsible for any charges that are not paid by your insurance.   E-visits via [employer]: generally incur a $35.00 fee.     Telephone visits:   Time spent on the phone: *charged based on time that is spent on the phone in increments of 10 minutes. Estimated cost:   5-10 mins $30.00   11-20 mins. $59.00   21-30 mins. $85.00     Use [employer] (secure email communication and access to your chart) to send your primary care provider a message or make an appointment. Ask someone on your Team how to sign up for [facility].     As always, Thank you for trusting us with your health care needs!      [facility]:    Scheduling Appointments  [facility]  Call: [phone]    Isabel Elkins [facility]  Call: [phone]    [facility]  Locations  Call: [phone]      WHERE TO GO FOR CARE?    Clinic    Make an appointment if you:       Are sick (cold, cough, flu, sore throat, earache or in pain).       Have a small injury (sprain, small cut, burn or broken bone).       Need a physical exam, Pap smear, vaccine or prescription refill.       Have questions about your health or medicines.    To reach us:      Call [phone] ([phone]). Open 24 hours every day. (For counseling services, call [phone].)    Log into [employer] at [employer]. (Visit Appriss to create an account.) Hospital emergency room    An emergency is a serious or life- threatening problem that must be treated right away.    Call [phone] or get to the hospital if you have:      Very bad or sudden:            - Chest pain or pressure         - Bleeding         - Head or belly pain         - Dizziness or trouble seeing, walking or                          Speaking      Problems breathing      Blood in your vomit or you are coughing up blood      A major injury (knocked out, loss of a finger or limb, rape, broken bone protruding from skin)    A mental health crisis. (Or call the Mental Health Crisis line at 1-497.670.1022 or Suicide Prevention Hotline at 1-626.445.2197.)    Open 24 hours every day. You don't need an appointment.     Urgent care    Visit urgent care for sickness or small injuries when the clinic is closed. You don't need an appointment. To check hours or find an urgent care near you, visit [email or web address]. Online care    Get online care from [facility] for more than 70 common problems, like colds, allergies and infections. Open 24 hours every day at:   [email or web address]   Need help deciding?    For advice about where to be seen, you may call your clinic and ask to speak with a nurse. We're here for you 24 hours every day.         If you are deaf or hard of hearing, please let us know. We provide many free services including sign language  interpreters, oral interpreters, TTYs, telephone amplifiers, note takers and written materials.

## 2017-08-31 LAB
ACTH PLAS-MCNC: 13 PG/ML
ALBUMIN SERPL-MCNC: 4.3 G/DL (ref 3.4–5)
ALP SERPL-CCNC: 62 U/L (ref 40–150)
ALT SERPL W P-5'-P-CCNC: 24 U/L (ref 0–50)
ANION GAP SERPL CALCULATED.3IONS-SCNC: 6 MMOL/L (ref 3–14)
AST SERPL W P-5'-P-CCNC: 20 U/L (ref 0–45)
BILIRUB SERPL-MCNC: 0.7 MG/DL (ref 0.2–1.3)
BUN SERPL-MCNC: 13 MG/DL (ref 7–30)
CALCIUM SERPL-MCNC: 9.1 MG/DL (ref 8.5–10.1)
CHLORIDE SERPL-SCNC: 107 MMOL/L (ref 94–109)
CO2 SERPL-SCNC: 25 MMOL/L (ref 20–32)
CREAT SERPL-MCNC: 0.67 MG/DL (ref 0.52–1.04)
FERRITIN SERPL-MCNC: 92 NG/ML (ref 12–150)
GFR SERPL CREATININE-BSD FRML MDRD: >90 ML/MIN/1.7M2
GLUCOSE SERPL-MCNC: 94 MG/DL (ref 70–99)
IRON SATN MFR SERPL: 26 % (ref 15–46)
IRON SERPL-MCNC: 81 UG/DL (ref 35–180)
MAGNESIUM SERPL-MCNC: 2.3 MG/DL (ref 1.6–2.3)
POTASSIUM SERPL-SCNC: 4 MMOL/L (ref 3.4–5.3)
PROT SERPL-MCNC: 7.9 G/DL (ref 6.8–8.8)
SODIUM SERPL-SCNC: 138 MMOL/L (ref 133–144)
T4 FREE SERPL-MCNC: 0.86 NG/DL (ref 0.76–1.46)
THYROPEROXIDASE AB SERPL-ACNC: <10 IU/ML
TIBC SERPL-MCNC: 313 UG/DL (ref 240–430)
TSH RECEP AB SER-ACNC: <1 IU/L
TSH SERPL DL<=0.005 MIU/L-ACNC: 1.13 MU/L (ref 0.4–4)

## 2017-09-05 LAB — TSI SER-ACNC: <1 TSI INDEX

## 2017-09-07 ENCOUNTER — MYC MEDICAL ADVICE (OUTPATIENT)
Dept: FAMILY MEDICINE | Facility: CLINIC | Age: 42
End: 2017-09-07

## 2017-09-07 DIAGNOSIS — E03.8 OTHER SPECIFIED HYPOTHYROIDISM: Primary | ICD-10-CM

## 2017-09-07 LAB — LAB SCANNED RESULT: NORMAL

## 2017-09-11 ENCOUNTER — TELEPHONE (OUTPATIENT)
Dept: FAMILY MEDICINE | Facility: CLINIC | Age: 42
End: 2017-09-11

## 2017-09-11 DIAGNOSIS — E03.8 OTHER SPECIFIED HYPOTHYROIDISM: ICD-10-CM

## 2017-09-11 PROCEDURE — 99000 SPECIMEN HANDLING OFFICE-LAB: CPT | Performed by: FAMILY MEDICINE

## 2017-09-11 PROCEDURE — 84482 T3 REVERSE: CPT | Mod: 90 | Performed by: FAMILY MEDICINE

## 2017-09-11 PROCEDURE — 36415 COLL VENOUS BLD VENIPUNCTURE: CPT | Performed by: FAMILY MEDICINE

## 2017-09-12 NOTE — TELEPHONE ENCOUNTER
"Relayed message, she had her lab drawn yesterday. She is feeling more \"hypo- tired, left heart palpitations, joint pain, like having the flu.\"   Her naturopath is out on leave & her partner is overwhelmed so she wants to f/u with Dr. Black at an appt rather than via [employer]. Scheduled 10/9.  Maki Abdalla RN   "

## 2017-09-13 LAB — T3REVERSE SERPL-MCNC: 11 NG/DL (ref 9–27)

## 2017-09-17 ENCOUNTER — MYC MEDICAL ADVICE (OUTPATIENT)
Dept: FAMILY MEDICINE | Facility: CLINIC | Age: 42
End: 2017-09-17

## 2017-09-17 DIAGNOSIS — R00.2 PALPITATIONS: Primary | ICD-10-CM

## 2017-09-18 NOTE — TELEPHONE ENCOUNTER
"Route to PCP to advise. Reviewed chart- On 9/12 patient scheduled for 10/9 & said she was feeling more \"hypothyroid- tired, left heart palpitations, joint pain, like having the flu.\"   Maki Abdalla RN   "

## 2017-09-18 NOTE — TELEPHONE ENCOUNTER
Please ask eGmini MEDINA how to place in epic and then contact patient to have it placed.  Fiona Black MD

## 2017-09-20 NOTE — TELEPHONE ENCOUNTER
Called and left voicemail for patient. Offered to help schedule a Nurse Only Anc. Visit to get her Cardiac Event Monitor placed.   Order was placed in Epic by Dr. Black.   If patient calls back please help her schedule on the ancillary. We have staff available today between 2 and 4 if she can make in into the clinic.   There is no available staff on Thursday or Friday of this week at [place] [facility].     Haley Collier MA

## 2017-09-28 ENCOUNTER — ALLIED HEALTH/NURSE VISIT (OUTPATIENT)
Dept: NURSING | Facility: CLINIC | Age: 42
End: 2017-09-28

## 2017-09-28 DIAGNOSIS — R00.2 PALPITATIONS: Primary | ICD-10-CM

## 2017-10-09 ENCOUNTER — OFFICE VISIT (OUTPATIENT)
Dept: FAMILY MEDICINE | Facility: CLINIC | Age: 42
End: 2017-10-09
Payer: COMMERCIAL

## 2017-10-09 VITALS
DIASTOLIC BLOOD PRESSURE: 74 MMHG | WEIGHT: 164 LBS | HEIGHT: 64 IN | TEMPERATURE: 97.7 F | SYSTOLIC BLOOD PRESSURE: 120 MMHG | BODY MASS INDEX: 28 KG/M2 | HEART RATE: 76 BPM

## 2017-10-09 DIAGNOSIS — R00.2 PALPITATIONS: ICD-10-CM

## 2017-10-09 DIAGNOSIS — I10 HYPERTENSION GOAL BP (BLOOD PRESSURE) < 140/90: ICD-10-CM

## 2017-10-09 DIAGNOSIS — E55.9 VITAMIN D DEFICIENCY: ICD-10-CM

## 2017-10-09 DIAGNOSIS — E03.8 OTHER SPECIFIED HYPOTHYROIDISM: Primary | ICD-10-CM

## 2017-10-09 LAB — T3FREE SERPL-MCNC: 2.2 PG/ML (ref 2.3–4.2)

## 2017-10-09 PROCEDURE — 84481 FREE ASSAY (FT-3): CPT | Performed by: FAMILY MEDICINE

## 2017-10-09 PROCEDURE — 99214 OFFICE O/P EST MOD 30 MIN: CPT | Performed by: FAMILY MEDICINE

## 2017-10-09 PROCEDURE — 93227 XTRNL ECG REC<48 HR R&I: CPT | Performed by: INTERNAL MEDICINE

## 2017-10-09 PROCEDURE — 93272 ECG/REVIEW INTERPRET ONLY: CPT | Performed by: INTERNAL MEDICINE

## 2017-10-09 PROCEDURE — 99000 SPECIMEN HANDLING OFFICE-LAB: CPT | Performed by: FAMILY MEDICINE

## 2017-10-09 PROCEDURE — 84439 ASSAY OF FREE THYROXINE: CPT | Performed by: FAMILY MEDICINE

## 2017-10-09 PROCEDURE — 84443 ASSAY THYROID STIM HORMONE: CPT | Performed by: FAMILY MEDICINE

## 2017-10-09 PROCEDURE — 36415 COLL VENOUS BLD VENIPUNCTURE: CPT | Performed by: FAMILY MEDICINE

## 2017-10-09 PROCEDURE — 84482 T3 REVERSE: CPT | Mod: 90 | Performed by: FAMILY MEDICINE

## 2017-10-09 RX ORDER — PROPRANOLOL HYDROCHLORIDE 10 MG/1
10 TABLET ORAL DAILY PRN
Qty: 1 TABLET | Refills: 0 | COMMUNITY
Start: 2017-10-09 | End: 2018-03-07

## 2017-10-09 ASSESSMENT — PATIENT HEALTH QUESTIONNAIRE - PHQ9: SUM OF ALL RESPONSES TO PHQ QUESTIONS 1-9: 3

## 2017-10-09 NOTE — NURSING NOTE
"Chief Complaint   Patient presents with     Thyroid Disease     Holter     Placement       Initial /74 (BP Location: Right arm, Patient Position: Sitting, Cuff Size: Adult Regular)  Pulse 76  Temp 97.7  F (36.5  C) (Oral)  Ht 5' 4\" (1.626 m)  Wt 164 lb (74.4 kg)  LMP 09/24/2017  BMI 28.15 kg/m2 Estimated body mass index is 28.15 kg/(m^2) as calculated from the following:    Height as of this encounter: 5' 4\" (1.626 m).    Weight as of this encounter: 164 lb (74.4 kg).  Medication Reconciliation: complete    "

## 2017-10-09 NOTE — PATIENT INSTRUCTIONS
[facility]   Discharged by : Reena YE MA    If you have any questions regarding your visit please contact your care team:     [facility] Clinic Hours Telephone Number   Dr. Gemini Muniz   7am-7pm Monday - Thursday   7am-5pm Fridays  [phone]   (Appointment scheduling available 24/7)   RN Line   [phone] option 2       For a Price Quote for your services, please call our Consumer Price Line at [phone].     What options do I have for visits at the clinic other than the traditional office visit?     To expand how we care for you, many of our providers are utilizing electronic visits (e-visits) and telephone visits, when medically appropriate, for interactions with their patients rather than a visit in the clinic. We also offer nurse visits for many medical concerns. Just like any other service, we will bill your insurance company for this type of visit based on time spent on the phone with your provider. Not all insurance companies cover these visits. Please check with your medical insurance if this type of visit is covered. You will be responsible for any charges that are not paid by your insurance.   E-visits via [employer]: generally incur a $35.00 fee.     Telephone visits:   Time spent on the phone: *charged based on time that is spent on the phone in increments of 10 minutes. Estimated cost:   5-10 mins $30.00   11-20 mins. $59.00   21-30 mins. $85.00     Use RunAlonghart (secure email communication and access to your chart) to send your primary care provider a message or make an appointment. Ask someone on your Team how to sign up for [employer].     As always, Thank you for trusting us with your health care needs!      [facility]:    Scheduling Appointments  [facility]  Call: [phone]    Isabel Elkins [facility]  Call: [phone]    [facility]  Locations  Call: [phone]      WHERE TO GO FOR CARE?    Clinic    Make an appointment if you:       Are sick (cold, cough, flu, sore throat, earache or in pain).       Have a small injury (sprain, small cut, burn or broken bone).       Need a physical exam, Pap smear, vaccine or prescription refill.       Have questions about your health or medicines.    To reach us:      Call [phone] ([phone]). Open 24 hours every day. (For counseling services, call [phone].)    Log into [employer] at [employer]. (Visit Livestation to create an account.) Hospital emergency room    An emergency is a serious or life- threatening problem that must be treated right away.    Call [phone] or get to the hospital if you have:      Very bad or sudden:            - Chest pain or pressure         - Bleeding         - Head or belly pain         - Dizziness or trouble seeing, walking or                          Speaking      Problems breathing      Blood in your vomit or you are coughing up blood      A major injury (knocked out, loss of a finger or limb, rape, broken bone protruding from skin)    A mental health crisis. (Or call the Mental Health Crisis line at 1-266.491.8760 or Suicide Prevention Hotline at 1-406.350.8688.)    Open 24 hours every day. You don't need an appointment.     Urgent care    Visit urgent care for sickness or small injuries when the clinic is closed. You don't need an appointment. To check hours or find an urgent care near you, visit [email or web address]. Online care    Get online care from [facility] for more than 70 common problems, like colds, allergies and infections. Open 24 hours every day at:   [email or web address]   Need help deciding?    For advice about where to be seen, you may call your clinic and ask to speak with a nurse. We're here for you 24 hours every day.         If you are deaf or hard of hearing, please let us know. We provide many free services including sign language  interpreters, oral interpreters, TTYs, telephone amplifiers, note takers and written materials.

## 2017-10-09 NOTE — NURSING NOTE
Holter monitor placed on 10/9/17 @ 3pm.  Patient instructed in use, questions answered. Instructed the patient to remove and mail the device via the pre-addressed and pre-postage box/envelope after 72 hours (per Dr. Black if event) -and if no event 7 days (per provider instruction).    Reena Barrientos MA

## 2017-10-09 NOTE — MR AVS SNAPSHOT
After Visit Summary   10/9/2017    Johnna Severson    MRN: [id number]           Patient Information     Date Of Birth          1975        Visit Information        Provider Department      10/9/2017 2:00 PM Fiona Black MD [facility]        Today's Diagnoses     Other specified hypothyroidism    -  1    Hypertension goal BP (blood pressure) < 140/90        Palpitations          Care Instructions    [facility]   Discharged by : Reena YE MA    If you have any questions regarding your visit please contact your care team:     [facility] Clinic Hours Telephone Number   Dr. Gemini Muniz   7am-7pm Monday - Thursday   7am-5pm Fridays  [phone]   (Appointment scheduling available 24/7)   RN Line   [phone] option 2       For a Price Quote for your services, please call our Consumer Price Line at [phone].     What options do I have for visits at the clinic other than the traditional office visit?     To expand how we care for you, many of our providers are utilizing electronic visits (e-visits) and telephone visits, when medically appropriate, for interactions with their patients rather than a visit in the clinic. We also offer nurse visits for many medical concerns. Just like any other service, we will bill your insurance company for this type of visit based on time spent on the phone with your provider. Not all insurance companies cover these visits. Please check with your medical insurance if this type of visit is covered. You will be responsible for any charges that are not paid by your insurance.   E-visits via [employer]: generally incur a $35.00 fee.     Telephone visits:   Time spent on the phone: *charged based on time that is spent on the phone in increments of 10 minutes. Estimated cost:   5-10 mins $30.00   11-20 mins. $59.00   21-30 mins. $85.00     Use [employer] (secure  email communication and access to your chart) to send your primary care provider a message or make an appointment. Ask someone on your Team how to sign up for [employer].     As always, Thank you for trusting us with your health care needs!      [facility]:    Scheduling Appointments  [facility]  Call: [phone]    [facility] [place]  Call: [phone]    [facility]  Call: [phone]      WHERE TO GO FOR CARE?    Clinic    Make an appointment if you:       Are sick (cold, cough, flu, sore throat, earache or in pain).       Have a small injury (sprain, small cut, burn or broken bone).       Need a physical exam, Pap smear, vaccine or prescription refill.       Have questions about your health or medicines.    To reach us:      Call [phone] ([phone]). Open 24 hours every day. (For counseling services, call [phone].)    Log into [employer] at [email or web address]. (Visit [email or web address] to create an account.) Hospital emergency room    An emergency is a serious or life- threatening problem that must be treated right away.    Call 660 or get to the hospital if you have:      Very bad or sudden:            - Chest pain or pressure         - Bleeding         - Head or belly pain         - Dizziness or trouble seeing, walking or                          Speaking      Problems breathing      Blood in your vomit or you are coughing up blood      A major injury (knocked out, loss of a finger or limb, rape, broken bone protruding from skin)    A mental health crisis. (Or call the Mental Health Crisis line at 1-601.104.9066 or Suicide Prevention Hotline at 1-228.431.8318.)    Open 24 hours every day. You don't need an appointment.     Urgent care    Visit urgent care for sickness or small injuries when the clinic is closed. You don't need an appointment. To check hours or find an urgent care near you, visit  [email or web address]. Online care    Get online care from [facility] for more than 70 common problems, like colds, allergies and infections. Open 24 hours every day at:   [email or web address]   Need help deciding?    For advice about where to be seen, you may call your clinic and ask to speak with a nurse. We're here for you 24 hours every day.         If you are deaf or hard of hearing, please let us know. We provide many free services including sign language interpreters, oral interpreters, TTYs, telephone amplifiers, note takers and written materials.                         Follow-ups after your visit        Future tests that were ordered for you today     Open Future Orders        Priority Expected Expires Ordered    US Thyroid Routine  10/9/2018 10/9/2017            Who to contact     If you have questions or need follow up information about today's clinic visit or your schedule please contact [facility] directly at [phone].  Normal or non-critical lab and imaging results will be communicated to you by MyChart, letter or phone within 4 business days after the clinic has received the results. If you do not hear from us within 7 days, please contact the clinic through [employer] or phone. If you have a critical or abnormal lab result, we will notify you by phone as soon as possible.  Submit refill requests through [employer] or call your pharmacy and they will forward the refill request to us. Please allow 3 business days for your refill to be completed.          Additional Information About Your Visit        [employer] Information     [employer] gives you secure access to your electronic health record. If you see a primary care provider, you can also send messages to your care team and make appointments. If you have questions, please call your primary care clinic.  If you do not have a primary care provider, please call [phone] and they will assist you.        Care EveryWhere ID     This is your Care  "EveryWhere ID. This could be used by other organizations to access your [place] medical records  [id number]        Your Vitals Were     Pulse Temperature Height Last Period BMI (Body Mass Index)       76 97.7  F (36.5  C) (Oral) 5' 4\" (1.626 m) 09/24/2017 28.15 kg/m2        Blood Pressure from Last 3 Encounters:   10/09/17 120/74   08/30/17 130/76   07/20/17 114/62    Weight from Last 3 Encounters:   10/09/17 164 lb (74.4 kg)   08/30/17 163 lb 4 oz (74 kg)   07/20/17 165 lb 3.2 oz (74.9 kg)              We Performed the Following     T3 reverse     T3, Free     T4, free     TSH        Primary Care Provider Office Phone # Fax #    Fiona Black [phone]       [address] [facility] [address]        Equal Access to Services     MADISYN RECINOS : Hadii sameer mckeono Soalvaro, waaxda luqadaha, qaybta kaalmada adeegyada, janine blackman . So [facility] [phone].    ATENCIÓN: Si jaylinla español, tiene a montaño disposición servicios gratuitos de asistencia lingüística. Llame al [phone].    We comply with applicable federal civil rights laws and [place] laws. We do not discriminate on the basis of race, color, national origin, age, disability, sex, sexual orientation, or gender identity.            Thank you!     Thank you for choosing [facility]  for your care. Our goal is always to provide you with excellent care. Hearing back from our patients is one way we can continue to improve our services. Please take a few minutes to complete the written survey that you may receive in the mail after your visit with us. Thank you!             Your Updated Medication List - Protect others around you: Learn how to safely use, store and throw away your medicines at www.disposemymeds.org.          This list is accurate as of: 10/9/17  2:50 PM.  Always use your most recent med list.                   Brand Name Dispense Instructions for use Diagnosis    " ALPRAZOLAM XR 1 MG 24 hr tablet   Generic drug:  ALPRAZolam     1 tablet    Take 1 tablet (1 mg) by mouth 2 times daily Prescribed by psychiatry    CHEYENNE (generalized anxiety disorder), Panic disorder       B COMPLEX PO      Take  by mouth.        cholecalciferol 5000 UNITS Caps capsule    vitamin D3     Take by mouth daily        cyclobenzaprine 10 MG tablet    FLEXERIL    90 tablet    Take 1 tablet (10 mg) by mouth 3 times daily as needed for muscle spasms    Fibromyalgia       metoprolol 25 MG tablet    LOPRESSOR     Take 1 tablet (25 mg) by mouth 2 times daily        NASONEX 50 MCG/ACT spray   Generic drug:  mometasone      Spray 2 sprays into both nostrils daily.        norethindrone 0.35 MG per tablet    MICRONOR    84 tablet    Take 1 tablet (0.35 mg) by mouth daily    General counseling for prescription of oral contraceptives       order for DME     960 mL    Magnesium chloride 12.5% solution.  Take 2-4 tablespoons daily.  Mix in juice or water before taking.  Keep in refrigerator.    Chronic migraine without aura without status migrainosus, not intractable       PROBIOTIC PO      Take  by mouth.        PROGESTERONE PO      Take 200 mg by mouth Take one capsule by mouth at bedtime on days 10-28 of cycle. Stop if period starts.        SUMAtriptan 100 MG tablet    IMITREX    9 tablet    Take 1 tablet (100 mg) by mouth at onset of headache for migraine May repeat in 2 hours if needed: max 2/day;    Migraine with aura and without status migrainosus, not intractable       * UNABLE TO FIND      MEDICATION NAME: Triiodo-L-Thyroninr (T3) SR 4 mcg capsule - take one capsule by mouth in early afternoon        * UNABLE TO FIND     1 each    MEDICATION NAME: T4 30 MCG/T3 4 MCG capsule - take one capsule by mouth in the morning on empty stomach        VENTOLIN  (90 BASE) MCG/ACT Inhaler   Generic drug:  albuterol     18 g    INHALE 2 PUFFS INTO THE LUNGS EVERY 6 HOURS AS NEEDED FOR SHORTNESS OF BREATH     Intermittent asthma, uncomplicated       * Notice:  This list has 2 medication(s) that are the same as other medications prescribed for you. Read the directions carefully, and ask your doctor or other care provider to review them with you.

## 2017-10-09 NOTE — PROGRESS NOTES
SUBJECTIVE:   Johnna Severson is a 42 year old female who presents to clinic today for the following health issues:      Hypertension Follow-up      Outpatient blood pressures are being checked at home.  Results are 118/73.    Low Salt Diet: not monitoring salt     Depression and Anxiety Follow-Up    Status since last visit: No change    Other associated symptoms:None    Complicating factors:     Significant life event: No     Current substance abuse: None    PHQ-9 Score and MyChart F/U Questions 12/16/2016 3/13/2017 10/9/2017   Total Score 0 0 3   Q9: Suicide Ideation Not at all Not at all Not at all     CHEYENNE-7 SCORE 7/29/2016 12/16/2016 3/13/2017   Total Score - - -   Total Score - - -   Total Score 4 3 2       PHQ-9  English  PHQ-9   Any Language  GAD7  Suicide Assessment Five-step Evaluation and Treatment (SAFE-T)  Asthma Follow-Up    Was ACT completed today?    Yes    ACT Total Scores 10/9/2017   ACT TOTAL SCORE -   ASTHMA ER VISITS -   ASTHMA HOSPITALIZATIONS -   ACT TOTAL SCORE (Goal Greater than or Equal to 20) 24   In the past 12 months, how many times did you visit the emergency room for your asthma without being admitted to the hospital? 0   In the past 12 months, how many times were you hospitalized overnight because of your asthma? 0       Recent asthma triggers that patient is dealing with: Fall weather, [place]    Hypothyroidism Follow-up      Since last visit, patient describes the following symptoms: Weight stable, no hair loss, no skin changes, no constipation, no loose stools.  Palpitations    Amount of exercise or physical activity: 4-5 days/week for an average of 45-60 minutes    Problems taking medications regularly: No    Medication side effects: none  Diet: regular (no restrictions)    Thyroid medication-- Restarted medication 3 weeks ago through her compound pharmacy. T4 30 MCG, T3 4 MCG, and sustained released T3 6 hours after morning dose 4 MCG. She started an Adrenal supplement 2 weeks  ago.     -- Patient states that she went on a girls trip which which was very relaxing for her. She reports that she had palpitation some days before she went to her trip and she notes that taking her propranolol medication was beneficial. She notes that during her episodes, her hands are usually vibrating and her legs occasionally vibrate. Patient would like to have her lab work done in order to see how she is responding to her medications and she also intends to follow up with her pharmacy with her lab results.       Problem list and histories reviewed & adjusted, as indicated.  Additional history: as documented    Patient Active Problem List   Diagnosis     Depression, major     Migraine headache     CHEYENNE (generalized anxiety disorder)     CARDIOVASCULAR SCREENING; LDL GOAL LESS THAN 160     Intermittent asthma     Vitamin D deficiency     Elevated LFTs     Hypertension goal BP (blood pressure) < 140/90     Encounter for IUD insertion     Zinc deficiency     IBS (irritable bowel syndrome)     Lactose intolerance     Nonallergic rhinitis     Diagnostic skin and sensitization tests     Keratosis pilaris     Other specified hypothyroidism     Fibromyalgia     Migraine with aura and without status migrainosus, not intractable     Iron deficiency anemia, unspecified iron deficiency anemia type     Past Surgical History:   Procedure Laterality Date     BIOPSY      Cervical and skin punch not sure on dates. done at [place].     CRYOTHERAPY, CERVICAL  2002       Social History   Substance Use Topics     Smoking status: Former Smoker     Packs/day: 0.50     Years: 10.00     Types: Cigarettes     Quit date: 8/18/2002     Smokeless tobacco: Former User     Alcohol use No     Family History   Problem Relation Age of Onset     DIABETES Maternal Grandfather      CANCER Maternal Grandfather      brain     Cardiovascular Maternal Grandmother      carotid stenosis, CHF     Hypertension Maternal Grandmother      Anxiety Disorder  Maternal Grandmother      Thyroid Disease Maternal Grandmother      Lipids Brother      Allergies Brother      Hypertension Mother      Allergies Mother      Arthritis Mother      Other - See Comments Mother      Mangioma removed Sept 2015     Anxiety Disorder Mother      DIABETES Father      C.A.D. Father      CABG, stents, 52     HEART DISEASE Father      Alcohol/Drug Paternal Grandfather      Thyroid Disease Maternal Aunt      CANCER Other 41     uterine      Other Cancer Paternal Grandmother      Lung and Basel cell skin     CANCER Maternal Aunt      ov     CANCER Other      maternal cousin     DIABETES Other      Other Cancer Other      DIABETES Other      Hypertension Other      Anxiety Disorder Other      Anxiety Disorder Cousin      Thyroid Disease Cousin          Current Outpatient Prescriptions   Medication Sig Dispense Refill     propranolol (INDERAL) 10 MG tablet Take 1 tablet (10 mg) by mouth daily as needed 1 tablet 0     norethindrone (MICRONOR) 0.35 MG per tablet Take 1 tablet (0.35 mg) by mouth daily 84 tablet 3     VENTOLIN  (90 BASE) MCG/ACT Inhaler INHALE 2 PUFFS INTO THE LUNGS EVERY 6 HOURS AS NEEDED FOR SHORTNESS OF BREATH 18 g 3     SUMAtriptan (IMITREX) 100 MG tablet Take 1 tablet (100 mg) by mouth at onset of headache for migraine May repeat in 2 hours if needed: max 2/day; 9 tablet 11     cyclobenzaprine (FLEXERIL) 10 MG tablet Take 1 tablet (10 mg) by mouth 3 times daily as needed for muscle spasms 90 tablet 1     UNABLE TO FIND MEDICATION NAME: T4 30 MCG/T3 4 MCG capsule - take one capsule by mouth in the morning on empty stomach 1 each 0     ALPRAZolam (ALPRAZOLAM XR) 1 MG 24 hr tablet Take 1 tablet (1 mg) by mouth 2 times daily Prescribed by psychiatry 1 tablet 0     Progesterone Micronized (PROGESTERONE PO) Take 200 mg by mouth Take one capsule by mouth at bedtime on days 10-28 of cycle. Stop if period starts.       UNABLE TO FIND MEDICATION NAME: Triiodo-L-Thyroninr (T3) SR 4  "mcg capsule - take one capsule by mouth in early afternoon       order for DME Magnesium chloride 12.5% solution.  Take 2-4 tablespoons daily.  Mix in juice or water before taking.  Keep in refrigerator. 960 mL 6     cholecalciferol (VITAMIN D3) 5000 UNITS CAPS capsule Take by mouth daily       mometasone (NASONEX) 50 MCG/ACT nasal spray Spray 2 sprays into both nostrils daily.       B Complex Vitamins (B COMPLEX PO) Take  by mouth.       Probiotic Product (PROBIOTIC PO) Take  by mouth.       [DISCONTINUED] ORDER FOR DME Magnesium chloride 12.5% solution.  Take 2-4 tablespoons daily.  Mix in juice or water before taking.  Keep in refrigerator. 480 mL 6     Labs reviewed in EPIC      Reviewed and updated as needed this visit by clinical staffTobacco  Allergies       Reviewed and updated as needed this visit by Provider       ROS:  Constitutional, HEENT, cardiovascular, pulmonary, GI, , musculoskeletal, neuro, skin, endocrine and psych systems are negative, except as otherwise noted.    This document serves as a record of the services and decisions personally performed and made by Fiona Bains MD. It was created on his/her behalf by Gorge Edwards, a trained medical scribe. The creation of this document is based the provider's statements to the medical scribe.    Marixa Edwards  2:23 PM, October 9, 2017  OBJECTIVE:   /74 (BP Location: Right arm, Patient Position: Sitting, Cuff Size: Adult Regular)  Pulse 76  Temp 97.7  F (36.5  C) (Oral)  Ht 5' 4\" (1.626 m)  Wt 164 lb (74.4 kg)  LMP 09/24/2017  BMI 28.15 kg/m2  Body mass index is 28.15 kg/(m^2).  GENERAL: healthy, alert and no distress  PSYCH: mentation appears normal, affect normal/bright    Diagnostic Test Results:  Results for orders placed or performed in visit on 10/09/17   T4, free   Result Value Ref Range    T4 Free 0.86 0.76 - 1.46 ng/dL   T3, Free   Result Value Ref Range    Free T3 2.2 (L) 2.3 - 4.2 pg/mL   TSH   Result Value " Ref Range    TSH 1.33 0.40 - 4.00 mU/L   Holter Monitor 48 hour - Adult    Narrative    [address] [address] [phone]  10/9/2017      Patient:  Johnna Severson  Chart: [id number]  :  1975  Age:  42 year old  Sex:  female       Procedure:  48 Hour Holter Monitor Placed: please see scanned document for result once interpretation is completed.        Technician performing hook-up:  Reena Barrientos         ASSESSMENT/PLAN:   1. Other specified hypothyroidism  She has restarted her thyroid medication per outside provider and compounding pharmacist  - US Thyroid; Future  - T4, free  - T3, Free  - T3 reverse  - TSH    2. Hypertension goal BP (blood pressure) < 140/90  Chronic, stable, well controlled, continue current medication, refill done if needed     3. Palpitations  Holter placement today   - US Thyroid; Future  She has propranolol from alternative provider    4. Vitamin D deficiency  Is on OTC vitamin D Reviewed previous labs, has had normal PTH, Calcium, and D      FUTURE APPOINTMENTS:       - Follow-up visit pending results.    The information in this document, created by the medical scribe for me, accurately reflects the services I personally performed and the decisions made by me. I have reviewed and approved this document for accuracy prior to leaving the patient care area.  Fiona Black MD  2:23 PM, 10/09/17      Fiona Black MD  [facility]

## 2017-10-10 LAB
T4 FREE SERPL-MCNC: 0.86 NG/DL (ref 0.76–1.46)
TSH SERPL DL<=0.005 MIU/L-ACNC: 1.33 MU/L (ref 0.4–4)

## 2017-10-10 ASSESSMENT — ASTHMA QUESTIONNAIRES: ACT_TOTALSCORE: 24

## 2017-10-11 LAB — T3REVERSE SERPL-MCNC: 12.2 NG/DL (ref 9–27)

## 2017-10-16 ENCOUNTER — MYC MEDICAL ADVICE (OUTPATIENT)
Dept: FAMILY MEDICINE | Facility: CLINIC | Age: 42
End: 2017-10-16

## 2017-10-16 DIAGNOSIS — E03.8 OTHER SPECIFIED HYPOTHYROIDISM: Primary | ICD-10-CM

## 2017-10-17 NOTE — TELEPHONE ENCOUNTER
MIRIAN sent a message recommending a referral elsewhere. [facility].  Maki Abdalla RN

## 2017-10-24 ENCOUNTER — MYC MEDICAL ADVICE (OUTPATIENT)
Dept: FAMILY MEDICINE | Facility: CLINIC | Age: 42
End: 2017-10-24

## 2017-10-24 NOTE — TELEPHONE ENCOUNTER
Route to team as holter results show bigeminy but without clear symptoms & patient had palpitations yesterday.  Maki Abdalla RN

## 2017-10-31 ENCOUNTER — TELEPHONE (OUTPATIENT)
Dept: FAMILY MEDICINE | Facility: CLINIC | Age: 42
End: 2017-10-31

## 2017-10-31 DIAGNOSIS — M79.7 FIBROMYALGIA: ICD-10-CM

## 2017-10-31 NOTE — TELEPHONE ENCOUNTER
cyclobenzaprine (FLEXERIL) 10 MG tablet   10 mg, 3 TIMES DAILY PRN 1 ordered  EditCancel Reorder     Summary: Take 1 tablet (10 mg) by mouth 3 times daily as needed for muscle spasms, Disp-90 tablet, R-1, E-Prescribe   Dose, Route, Frequency: 10 mg, Oral, 3 TIMES DAILY PRN  Start: 12/16/2016  Ord/Sold: 12/16/2016 (O)  Report  Taking:   Long-term:   Pharmacy: [facility]/pharmacy #[id number] - [place] [address] [facility]  Med Dose History       Patient Sig: Take 1 tablet (10 mg) by mouth 3 times daily as needed for muscle spasms       Ordered on: 12/16/2016       Authorized by: EDEL DEAN       Dispense: 90 tablet        lov: 10-9-2017  Future Office visit:       Routing refill request to provider for review/approval because:  Drug not on the [facility], [facility] or  Health refill protocol or controlled substance

## 2017-11-02 RX ORDER — CYCLOBENZAPRINE HCL 10 MG
10 TABLET ORAL 3 TIMES DAILY PRN
Qty: 90 TABLET | Refills: 1 | Status: SHIPPED | OUTPATIENT
Start: 2017-11-02

## 2017-11-07 ENCOUNTER — RADIANT APPOINTMENT (OUTPATIENT)
Dept: ULTRASOUND IMAGING | Facility: CLINIC | Age: 42
End: 2017-11-07
Attending: FAMILY MEDICINE
Payer: COMMERCIAL

## 2017-11-07 DIAGNOSIS — R00.2 PALPITATIONS: ICD-10-CM

## 2017-11-07 DIAGNOSIS — E03.8 OTHER SPECIFIED HYPOTHYROIDISM: ICD-10-CM

## 2017-11-07 PROCEDURE — 76536 US EXAM OF HEAD AND NECK: CPT

## 2017-12-20 ENCOUNTER — MYC MEDICAL ADVICE (OUTPATIENT)
Dept: FAMILY MEDICINE | Facility: CLINIC | Age: 42
End: 2017-12-20

## 2018-01-09 ENCOUNTER — TELEPHONE (OUTPATIENT)
Dept: FAMILY MEDICINE | Facility: CLINIC | Age: 43
End: 2018-01-09

## 2018-01-09 DIAGNOSIS — G43.109 MIGRAINE WITH AURA AND WITHOUT STATUS MIGRAINOSUS, NOT INTRACTABLE: ICD-10-CM

## 2018-01-09 DIAGNOSIS — M79.7 FIBROMYALGIA: ICD-10-CM

## 2018-01-09 NOTE — TELEPHONE ENCOUNTER
cyclobenzaprine (FLEXERIL) 10 MG tablet      Last Written Prescription Date:  11/2/2017  Last Fill Quantity: 90 tablet,   # refills: 1  Last Office Visit: 10/9/2017    Future Office visit:       Routing refill request to provider for review/approval because:  Drug not on the [facility], [facility] or [facility] refill protocol or controlled substance

## 2018-01-10 NOTE — TELEPHONE ENCOUNTER
Medication Detail      Disp Refills Start End NAOMY   SUMAtriptan (IMITREX) 100 MG tablet 9 tablet 11 12/16/2016  No   Sig: Take 1 tablet (100 mg) by mouth at onset of headache for migraine May repeat in 2 hours if needed: max 2/day;   Class: E-Prescribe   Route: Oral   Order: [id number]   E-Prescribing Status: Receipt confirmed by pharmacy (12/16/2016 10:07 AM CST)       Last Office Visit: 10/9/2017  Future Office visit:       Routing refill request to provider for review/approval because:  Drug not on the [facility], [facility] or [facility] refill protocol or controlled substance

## 2018-01-12 NOTE — TELEPHONE ENCOUNTER
I know that this is ordered tid - so technically she could have a refill.  But it is not typically intended to be taken tid every day - just on days with flares.    Can you please get more information from the patient about how she is  Using it?

## 2018-01-16 RX ORDER — SUMATRIPTAN 100 MG/1
TABLET, FILM COATED ORAL
Qty: 9 TABLET | Refills: 0 | Status: SHIPPED | OUTPATIENT
Start: 2018-01-16 | End: 2018-03-07

## 2018-01-16 RX ORDER — CYCLOBENZAPRINE HCL 10 MG
TABLET ORAL
Qty: 90 TABLET | Refills: 0 | OUTPATIENT
Start: 2018-01-16

## 2018-01-16 NOTE — TELEPHONE ENCOUNTER
Called patient- she uses it once or twice a week. Her hips get sore if she's on her feet a lot.   She didn't know she was prescribed #90 with one refill. She will check with Jolene & call back PRN.    Maki Abdalla RN

## 2018-02-19 DIAGNOSIS — J45.20 INTERMITTENT ASTHMA, UNCOMPLICATED: ICD-10-CM

## 2018-02-19 RX ORDER — ALBUTEROL SULFATE 90 UG/1
AEROSOL, METERED RESPIRATORY (INHALATION)
Qty: 18 G | Refills: 1 | Status: SHIPPED | OUTPATIENT
Start: 2018-02-19 | End: 2018-07-19

## 2018-02-19 NOTE — TELEPHONE ENCOUNTER
"Requested Prescriptions   Pending Prescriptions Disp Refills     VENTOLIN  (90 BASE) MCG/ACT Inhaler [Pharmacy Med Name: VENTOLIN HFA INH W/DOS CTR [id number]] 18 g 0     Sig: INHALE 2 PUFFS INTO THE LUNGS EVERY 6 HOURS AS NEEDED FOR SHORTNESS OF BREATH    Asthma Maintenance Inhalers - Anticholinergics Passed    2/19/2018  2:55 PM       Passed - Patient is age 12 years or older       Passed - Asthma control test score is 20 or greater in last 6 months    Please review ACT score.          Passed - Recent (6 mo) or future visit with authorizing provider's specialty    Patient had office visit in the last 6 months or has a visit in the next 30 days with authorizing provider.  See \"Patient Info\" tab in inbasket, or \"Choose Columns\" in Meds & Orders section of the refill encounter.            Last Written Prescription Date:  2/19/18  Last Fill Quantity: 18 g,  # refills: 0   Last office visit: 10/9/2017 with prescribing provider:  10/9/17   Future Office Visit:      "

## 2018-03-07 ENCOUNTER — OFFICE VISIT (OUTPATIENT)
Dept: FAMILY MEDICINE | Facility: CLINIC | Age: 43
End: 2018-03-07
Payer: COMMERCIAL

## 2018-03-07 VITALS
HEIGHT: 64 IN | TEMPERATURE: 98.4 F | BODY MASS INDEX: 28.34 KG/M2 | DIASTOLIC BLOOD PRESSURE: 73 MMHG | WEIGHT: 166 LBS | HEART RATE: 89 BPM | SYSTOLIC BLOOD PRESSURE: 114 MMHG

## 2018-03-07 DIAGNOSIS — E06.3 HYPOTHYROIDISM DUE TO HASHIMOTO'S THYROIDITIS: ICD-10-CM

## 2018-03-07 DIAGNOSIS — G43.109 MIGRAINE WITH AURA AND WITHOUT STATUS MIGRAINOSUS, NOT INTRACTABLE: Primary | ICD-10-CM

## 2018-03-07 DIAGNOSIS — F41.1 GAD (GENERALIZED ANXIETY DISORDER): ICD-10-CM

## 2018-03-07 DIAGNOSIS — F41.0 PANIC DISORDER: ICD-10-CM

## 2018-03-07 DIAGNOSIS — J45.20 MILD INTERMITTENT ASTHMA WITHOUT COMPLICATION: ICD-10-CM

## 2018-03-07 DIAGNOSIS — E06.3 HASHIMOTO'S THYROIDITIS: ICD-10-CM

## 2018-03-07 PROCEDURE — 99215 OFFICE O/P EST HI 40 MIN: CPT | Performed by: FAMILY MEDICINE

## 2018-03-07 RX ORDER — ALPRAZOLAM 1 MG/1
1 TABLET, EXTENDED RELEASE ORAL EVERY MORNING
Qty: 30 TABLET | Refills: 0 | Status: SHIPPED | OUTPATIENT
Start: 2018-03-07 | End: 2018-04-01

## 2018-03-07 RX ORDER — ALPRAZOLAM 0.5 MG/1
0.5 TABLET, EXTENDED RELEASE ORAL AT BEDTIME
Qty: 30 TABLET | Refills: 0 | Status: SHIPPED | OUTPATIENT
Start: 2018-03-07 | End: 2018-04-01

## 2018-03-07 RX ORDER — SUMATRIPTAN 100 MG/1
TABLET, FILM COATED ORAL
Qty: 9 TABLET | Refills: 0 | Status: SHIPPED | OUTPATIENT
Start: 2018-03-07 | End: 2018-06-09

## 2018-03-07 RX ORDER — ALPRAZOLAM 0.5 MG
0.5 TABLET ORAL
COMMUNITY
End: 2018-03-07

## 2018-03-07 RX ORDER — ALPRAZOLAM 0.5 MG
0.5 TABLET ORAL
Qty: 90 TABLET | Status: CANCELLED | OUTPATIENT
Start: 2018-03-07

## 2018-03-07 RX ORDER — ALPRAZOLAM 1 MG/1
1 TABLET, EXTENDED RELEASE ORAL
Qty: 1 TABLET | Refills: 0 | Status: CANCELLED | OUTPATIENT
Start: 2018-03-07

## 2018-03-07 ASSESSMENT — ANXIETY QUESTIONNAIRES
1. FEELING NERVOUS, ANXIOUS, OR ON EDGE: SEVERAL DAYS
2. NOT BEING ABLE TO STOP OR CONTROL WORRYING: NOT AT ALL
7. FEELING AFRAID AS IF SOMETHING AWFUL MIGHT HAPPEN: NOT AT ALL
IF YOU CHECKED OFF ANY PROBLEMS ON THIS QUESTIONNAIRE, HOW DIFFICULT HAVE THESE PROBLEMS MADE IT FOR YOU TO DO YOUR WORK, TAKE CARE OF THINGS AT HOME, OR GET ALONG WITH OTHER PEOPLE: NOT DIFFICULT AT ALL
6. BECOMING EASILY ANNOYED OR IRRITABLE: NOT AT ALL
3. WORRYING TOO MUCH ABOUT DIFFERENT THINGS: SEVERAL DAYS
5. BEING SO RESTLESS THAT IT IS HARD TO SIT STILL: NOT AT ALL
GAD7 TOTAL SCORE: 2

## 2018-03-07 ASSESSMENT — PATIENT HEALTH QUESTIONNAIRE - PHQ9: 5. POOR APPETITE OR OVEREATING: NOT AT ALL

## 2018-03-07 NOTE — PATIENT INSTRUCTIONS
Follow up as needed  And with psych  Aubrey Hsu D.O.    [facility]   Discharged by : Haley Collier MA  Paper scripts provided to patient : yes     If you have any questions regarding your visit please contact your care team:     Team Gold                Clinic Hours Telephone Number     Dr. Gemini Azevedo NP 7am-7pm  Monday - Thursday   7am-5pm  Fridays  [phone]   (Appointment scheduling available 24/7)     RN Line  [phone] option 2     Urgent Care - [place] and [facility] [place] - 11am-9pm Monday-Friday Saturday-Sunday- 9am-5pm     [facility] -   5pm-9pm Monday-Friday Saturday-Sunday- 9am-5pm    [phone] - [place]    [phone] - [facility]       For a Price Quote for your services, please call our Consumer Price Line at [phone].     What options do I have for visits at the clinic other than the traditional office visit?     To expand how we care for you, many of our providers are utilizing electronic visits (e-visits) and telephone visits, when medically appropriate, for interactions with their patients rather than a visit in the clinic. We also offer nurse visits for many medical concerns. Just like any other service, we will bill your insurance company for this type of visit based on time spent on the phone with your provider. Not all insurance companies cover these visits. Please check with your medical insurance if this type of visit is covered. You will be responsible for any charges that are not paid by your insurance.   E-visits via medidametrics: generally incur a $35.00 fee.     Telephone visits:   Time spent on the phone: *charged based on time that is spent on the phone in increments of 10 minutes. Estimated cost:   5-10 mins $30.00   11-20 mins. $59.00   21-30 mins. $85.00     Use medidametrics (secure email communication and access to your chart) to send your primary  care provider a message or make an appointment. Ask someone on your Team how to sign up for Capigami.     As always, Thank you for trusting us with your health care needs!      [facility]:    Scheduling Appointments  [place]  Call: [phone]    [facility]  Call: [phone]    [facility]  Call: [phone]    For Gastroenterology referrals   [facility] G[facility]enter[facility]   Clinics and Surgery [place], 4th Floor   [address] [place] [address]   Appointments: [phone]    WHERE TO GO FOR CARE?  Clinic    Make an appointment if you:       Are sick (cold, cough, flu, sore throat, earache or in pain).       Have a small injury (sprain, small cut, burn or broken bone).       Need a physical exam, Pap smear, vaccine or prescription refill.       Have questions about your health or medicines.    To reach us:      Call [phone] ([phone]). Open 24 hours every day. (For counseling services, call [phone].)    Log into Capigami at [email or web address]. (Visit Project [email or web address] to create an account.) Hospital emergency room    An emergency is a serious or life- threatening problem that must be treated right away.    Call [phone] or get to the hospital if you have:      Very bad or sudden:            - Chest pain or pressure         - Bleeding         - Head or belly pain         - Dizziness or trouble seeing, walking or                          Speaking      Problems breathing      Blood in your vomit or you are coughing up blood      A major injury (knocked out, loss of a finger or limb, rape, broken bone protruding from skin)    A mental health crisis. (Or call the Mental Health Crisis line at 1-108.651.2371 or Suicide Prevention Hotline at 1-489.326.6914.)    Open 24 hours every day. You don't need an appointment.     Urgent care    Visit urgent care for sickness or small injuries when the clinic  is closed. You don't need an appointment. To check hours or find an urgent care near you, visit [email or web address]. Online care    Get online care from [facility] for more than 70 common problems, like colds, allergies and infections. Open 24 hours every day at:   [email or web address]   Need help deciding?    For advice about where to be seen, you may call your clinic and ask to speak with a nurse. We're here for you 24 hours every day.         If you are deaf or hard of hearing, please let us know. We provide many free services including sign language interpreters, oral interpreters, TTYs, telephone amplifiers, note takers and written materials.

## 2018-03-07 NOTE — MR AVS SNAPSHOT
After Visit Summary   3/7/2018    Johnna Severson    MRN: [id number]           Patient Information     Date Of Birth          1975        Visit Information        Provider Department      3/7/2018 12:20 PM Aubrey Hsu DO [facility]        Today's Diagnoses     Screening for diabetic retinopathy    -  1    Migraine with aura and without status migrainosus, not intractable        CHEYENNE (generalized anxiety disorder)        Panic disorder          Care Instructions    Follow up as needed  And with psych  Aubrey Hsu D.O.    [facility]   Discharged by : Haley Collier MA  Paper scripts provided to patient : yes     If you have any questions regarding your visit please contact your care team:     Team Gold                Clinic Hours Telephone Number     Dr. Gemini Azevedo, NP 7am-7pm  Monday - Thursday   7am-5pm  Fridays  [phone]   (Appointment scheduling available 24/7)     RN Line  [phone] option 2     Urgent Care - [place] - 11am-9pm Monday-Friday Saturday-Sunday- 9am-5pm     [place] -   5pm-9pm Monday-Friday Saturday-Sunday- 9am-5pm    [phone] - [place]    [phone] - [place]       For a Price Quote for your services, please call our Consumer Price Line at [phone].     What options do I have for visits at the clinic other than the traditional office visit?     To expand how we care for you, many of our providers are utilizing electronic visits (e-visits) and telephone visits, when medically appropriate, for interactions with their patients rather than a visit in the clinic. We also offer nurse visits for many medical concerns. Just like any other service, we will bill your insurance company for this type of visit based on time spent on the phone with your provider. Not all insurance companies cover  these visits. Please check with your medical insurance if this type of visit is covered. You will be responsible for any charges that are not paid by your insurance.   E-visits via VOIP Depothart: generally incur a $35.00 fee.     Telephone visits:   Time spent on the phone: *charged based on time that is spent on the phone in increments of 10 minutes. Estimated cost:   5-10 mins $30.00   11-20 mins. $59.00   21-30 mins. $85.00     Use Espressi (secure email communication and access to your chart) to send your primary care provider a message or make an appointment. Ask someone on your Team how to sign up for Espressi.     As always, Thank you for trusting us with your health care needs!      [facility]:    Scheduling Appointments  [place]  Call: [phone]    [facility]  Call: [phone]    [facility]  Call: [phone]    For Gastroenterology referrals   [facility]enter[facility]   Clinics and Surgery Center, 4th Floor   [address] [place] [address]   Appointments: [phone]    WHERE TO GO FOR CARE?  Clinic    Make an appointment if you:       Are sick (cold, cough, flu, sore throat, earache or in pain).       Have a small injury (sprain, small cut, burn or broken bone).       Need a physical exam, Pap smear, vaccine or prescription refill.       Have questions about your health or medicines.    To reach us:      Call [phone] ([phone]). Open 24 hours every day. (For counseling services, call [phone].)    Log into Espressi at [email or web address]. (Visit [email or web address] to create an account.) Hospital emergency room    An emergency is a serious or life- threatening problem that must be treated right away.    Call 660 or get to the hospital if you have:      Very bad or sudden:            - Chest pain or pressure         - Bleeding         - Head or belly pain         - Dizziness or  trouble seeing, walking or                          Speaking      Problems breathing      Blood in your vomit or you are coughing up blood      A major injury (knocked out, loss of a finger or limb, rape, broken bone protruding from skin)    A mental health crisis. (Or call the Mental Health Crisis line at 1-939.227.1985 or Suicide Prevention Hotline at 1-937.704.4522.)    Open 24 hours every day. You don't need an appointment.     Urgent care    Visit urgent care for sickness or small injuries when the clinic is closed. You don't need an appointment. To check hours or find an urgent care near you, visit [email or web address]. Online care    Get online care from [facility] for more than 70 common problems, like colds, allergies and infections. Open 24 hours every day at:   [email or web address]   Need help deciding?    For advice about where to be seen, you may call your clinic and ask to speak with a nurse. We're here for you 24 hours every day.         If you are deaf or hard of hearing, please let us know. We provide many free services including sign language interpreters, oral interpreters, TTYs, telephone amplifiers, note takers and written materials.               Follow-ups after your visit        Who to contact     If you have questions or need follow up information about today's clinic visit or your schedule please contact [facility] directly at [phone].  Normal or non-critical lab and imaging results will be communicated to you by MyChart, letter or phone within 4 business days after the clinic has received the results. If you do not hear from us within 7 days, please contact the clinic through MyChart or phone. If you have a critical or abnormal lab result, we will notify you by phone as soon as possible.  Submit refill requests through MeUndies or call your pharmacy and they will forward the refill request to us. Please allow 3 business days for your refill to be completed.          Additional  "Information About Your Visit        MyChart Information     [employer] gives you secure access to your electronic health record. If you see a primary care provider, you can also send messages to your care team and make appointments. If you have questions, please call your primary care clinic.  If you do not have a primary care provider, please call [phone] and they will assist you.        Care EveryWhere ID     This is your Care EveryWhere ID. This could be used by other organizations to access your [place] medical records  [id number]        Your Vitals Were     Pulse Temperature Height Last Period Breastfeeding? BMI (Body Mass Index)    89 98.4  F (36.9  C) (Oral) 5' 4\" (1.626 m) 02/20/2018 (Exact Date) No 28.49 kg/m2       Blood Pressure from Last 3 Encounters:   03/07/18 114/73   10/09/17 120/74   08/30/17 130/76    Weight from Last 3 Encounters:   03/07/18 166 lb (75.3 kg)   10/09/17 164 lb (74.4 kg)   08/30/17 163 lb 4 oz (74 kg)              Today, you had the following     No orders found for display         Today's Medication Changes          These changes are accurate as of 3/7/18  1:16 PM.  If you have any questions, ask your nurse or doctor.               These medicines have changed or have updated prescriptions.        Dose/Directions    * ALPRAZolam 1 MG 24 hr tablet   Commonly known as:  ALPRAZOLAM XR   This may have changed:    - when to take this  - Another medication with the same name was removed. Continue taking this medication, and follow the directions you see here.   Used for:  CHEYENNE (generalized anxiety disorder), Panic disorder   Changed by:  Aubrey Hsu DO        Dose:  1 mg   Take 1 tablet (1 mg) by mouth every morning Prescribed by psychiatry   Quantity:  30 tablet   Refills:  0       * ALPRAZolam 0.5 MG 24 hr tablet   Commonly known as:  ALPRAZOLAM XR   This may have changed:  You were already taking a medication with the same name, and this prescription was added. Make " sure you understand how and when to take each.   Used for:  CHEYENNE (generalized anxiety disorder), Panic disorder   Replaces:  XANAX 0.5 MG tablet   Changed by:  Aubrey Hsu DO        Dose:  0.5 mg   Take 1 tablet (0.5 mg) by mouth At Bedtime   Quantity:  30 tablet   Refills:  0       SUMAtriptan 100 MG tablet   Commonly known as:  IMITREX   This may have changed:  See the new instructions.   Used for:  Migraine with aura and without status migrainosus, not intractable   Changed by:  Aubrey Hsu DO        TAKE 1 TABLET BY MOUTH AT ONSET OF HEADACHE FOR MIGRAINE. MAY REPEAT IN 2 HOURS AS NEEDED. MAX OF 2/DAY   Quantity:  9 tablet   Refills:  0       * Notice:  This list has 2 medication(s) that are the same as other medications prescribed for you. Read the directions carefully, and ask your doctor or other care provider to review them with you.      Stop taking these medicines if you haven't already. Please contact your care team if you have questions.     XANAX 0.5 MG tablet   Generic drug:  ALPRAZolam   Replaced by:  ALPRAZolam 0.5 MG 24 hr tablet   You also have another medication with the same name that you need to continue taking as instructed.   Stopped by:  Aubrey Hsu DO                Where to get your medicines      These medications were sent to [facility] [id number] [place] [address] AT [place] [address], [facility] [address]     Phone:  [phone]     SUMAtriptan 100 MG tablet         Some of these will need a paper prescription and others can be bought over the counter.  Ask your nurse if you have questions.     Bring a paper prescription for each of these medications     ALPRAZolam 0.5 MG 24 hr tablet    ALPRAZolam 1 MG 24 hr tablet                Primary Care Provider Office Phone # Fax #    Aubrey Hsu [phone] [address] [facility] [address]        Equal Access  to Services     W[facility]ada[facility]SON : Hadii sameer Davidson, waedvinda luqadaha, qaybta kaalmajanine hidalgo. So [facility] [phone].    ATENCIÓN: Si sher price, tiene a montaño disposición servicios gratuitos de asistencia lingüística. Llame al [phone].    We comply with applicable federal civil rights laws and [place] laws. We do not discriminate on the basis of race, color, national origin, age, disability, sex, sexual orientation, or gender identity.            Thank you!     Thank you for choosing [facility]  for your care. Our goal is always to provide you with excellent care. Hearing back from our patients is one way we can continue to improve our services. Please take a few minutes to complete the written survey that you may receive in the mail after your visit with us. Thank you!             Your Updated Medication List - Protect others around you: Learn how to safely use, store and throw away your medicines at www.disposemymeds.org.          This list is accurate as of 3/7/18  1:16 PM.  Always use your most recent med list.                   Brand Name Dispense Instructions for use Diagnosis    * ALPRAZolam 1 MG 24 hr tablet    ALPRAZOLAM XR    30 tablet    Take 1 tablet (1 mg) by mouth every morning Prescribed by psychiatry    CHEYENNE (generalized anxiety disorder), Panic disorder       * ALPRAZolam 0.5 MG 24 hr tablet    ALPRAZOLAM XR    30 tablet    Take 1 tablet (0.5 mg) by mouth At Bedtime    CHEYENNE (generalized anxiety disorder), Panic disorder       B COMPLEX PO      Take  by mouth.        cholecalciferol 5000 UNITS Caps capsule    vitamin D3     Take by mouth daily        cyclobenzaprine 10 MG tablet    FLEXERIL    90 tablet    Take 1 tablet (10 mg) by mouth 3 times daily as needed for muscle spasms    Fibromyalgia       NASONEX 50 MCG/ACT spray   Generic drug:  mometasone      Spray 2 sprays into both nostrils daily.        order for DME      960 mL    Magnesium chloride 12.5% solution.  Take 2-4 tablespoons daily.  Mix in juice or water before taking.  Keep in refrigerator.    Chronic migraine without aura without status migrainosus, not intractable       PROBIOTIC PO      Take  by mouth.        PROGESTERONE PO      Take 200 mg by mouth Take one capsule by mouth at bedtime on days 10-28 of cycle. Stop if period starts.        SUMAtriptan 100 MG tablet    IMITREX    9 tablet    TAKE 1 TABLET BY MOUTH AT ONSET OF HEADACHE FOR MIGRAINE. MAY REPEAT IN 2 HOURS AS NEEDED. MAX OF 2/DAY    Migraine with aura and without status migrainosus, not intractable       * UNABLE TO FIND      MEDICATION NAME: Triiodo-L-Thyroninr (T3) SR 6 mcg capsule - take one capsule by mouth in early afternoon        * UNABLE TO FIND     1 each    MEDICATION NAME: T4 25 MCG/T3 9 MCG capsule - take one capsule by mouth in the morning on empty stomach        VENTOLIN  (90 BASE) MCG/ACT Inhaler   Generic drug:  albuterol     18 g    INHALE 2 PUFFS INTO THE LUNGS EVERY 6 HOURS AS NEEDED FOR SHORTNESS OF BREATH    Intermittent asthma, uncomplicated       * Notice:  This list has 4 medication(s) that are the same as other medications prescribed for you. Read the directions carefully, and ask your doctor or other care provider to review them with you.

## 2018-03-07 NOTE — PROGRESS NOTES
SUBJECTIVE:   Johnna Severson is a 42 year old female who presents to clinic today for the following health issues:      Anxiety Follow-Up    Status since last visit: from thyroid issues     Other associated symptoms:None    Complicating factors:   Significant life event: No   Current substance abuse: None  Depression symptoms: No  CHEYENNE-7 SCORE 12/16/2016 3/13/2017 3/7/2018   Total Score - - -   Total Score - - -   Total Score 3 2 2       CHEYENNE-7  Asthma Follow-Up    Was ACT completed today?    Yes    ACT Total Scores 3/7/2018   ACT TOTAL SCORE -   ASTHMA ER VISITS -   ASTHMA HOSPITALIZATIONS -   ACT TOTAL SCORE (Goal Greater than or Equal to 20) 25   In the past 12 months, how many times did you visit the emergency room for your asthma without being admitted to the hospital? 0   In the past 12 months, how many times were you hospitalized overnight because of your asthma? 0       Recent asthma triggers that patient is dealing with: None        Amount of exercise or physical activity: 4-5 days/week for an average of 45-60 minutes    Problems taking medications regularly: No    Medication side effects: none    Diet: regular (no restrictions)    Allergy induced asthma-only uses albuterol, no other inhalers      [facility]-she uses [facility], she was diagnosed with adrenal fatigue  Xanax for anxiety -xanax 1mg , 0.5 mg BID  She has T4/T3 in the AM, repeat T4 in the pm dose  Dose is evening out    She is on BID and has an appoint with psych 5/4/18      migrines prior to periods, only one time a month, she has an aura, stable dose    hypertension-stable, lost weight and now feeling great,           Problem list and histories reviewed & adjusted, as indicated.  Additional history: as documented    Patient Active Problem List   Diagnosis     Depression, major     Migraine headache     CHEYENNE (generalized anxiety disorder)     CARDIOVASCULAR SCREENING; LDL GOAL LESS THAN 160     Intermittent asthma      Vitamin D deficiency     Elevated LFTs     Hypertension goal BP (blood pressure) < 140/90     Encounter for IUD insertion     Zinc deficiency     IBS (irritable bowel syndrome)     Lactose intolerance     Nonallergic rhinitis     Diagnostic skin and sensitization tests     Keratosis pilaris     Other specified hypothyroidism     Fibromyalgia     Migraine with aura and without status migrainosus, not intractable     Iron deficiency anemia, unspecified iron deficiency anemia type     Hypothyroidism due to Hashimoto's thyroiditis     Past Surgical History:   Procedure Laterality Date     BIOPSY      Cervical and skin punch not sure on dates. done at [place].     CRYOTHERAPY, CERVICAL  2002       Social History   Substance Use Topics     Smoking status: Former Smoker     Packs/day: 0.50     Years: 10.00     Types: Cigarettes     Quit date: 8/18/2002     Smokeless tobacco: Former User     Alcohol use No     Family History   Problem Relation Age of Onset     DIABETES Maternal Grandfather      CANCER Maternal Grandfather      brain     Cardiovascular Maternal Grandmother      carotid stenosis, CHF     Hypertension Maternal Grandmother      Anxiety Disorder Maternal Grandmother      Thyroid Disease Maternal Grandmother      Lipids Brother      Allergies Brother      Hypertension Mother      Allergies Mother      Arthritis Mother      Other - See Comments Mother      Mangioma removed Sept 2015     Anxiety Disorder Mother      DIABETES Father      C.A.D. Father      CABG, stents, 52     HEART DISEASE Father      Alcohol/Drug Paternal Grandfather      Thyroid Disease Maternal Aunt      CANCER Other 41     uterine      Other Cancer Paternal Grandmother      Lung and Basel cell skin     CANCER Maternal Aunt      ov     CANCER Other      maternal cousin     DIABETES Other      Other Cancer Other      DIABETES Other      Hypertension Other      Anxiety Disorder Other      Anxiety Disorder Cousin      Thyroid Disease Cousin       "      Reviewed and updated as needed this visit by clinical staff  Tobacco  Allergies  Meds  Problems  Med Hx  Surg Hx  Fam Hx  Soc Hx        Reviewed and updated as needed this visit by Provider  Allergies  Meds  Problems         ROS:  Constitutional, HEENT, cardiovascular, pulmonary, GI, , musculoskeletal, neuro, skin, endocrine and psych systems are negative, except as otherwise noted.    OBJECTIVE:     /73  Pulse 89  Temp 98.4  F (36.9  C) (Oral)  Ht 5' 4\" (1.626 m)  Wt 166 lb (75.3 kg)  LMP 02/20/2018 (Exact Date)  Breastfeeding? No  BMI 28.49 kg/m2  Body mass index is 28.49 kg/(m^2).  GENERAL: healthy, alert and no distress  EYES: Eyes grossly normal to inspection, PERRL and conjunctivae and sclerae normal  HENT: ear canals and TM's normal, nose and mouth without ulcers or lesions  NECK: no adenopathy, no asymmetry, masses, or scars and thyroid normal to palpation  RESP: lungs clear to auscultation - no rales, rhonchi or wheezes  CV: regular rate and rhythm, normal S1 S2, no S3 or S4, no murmur, click or rub, no peripheral edema and peripheral pulses strong  ABDOMEN: soft, nontender, no hepatosplenomegaly, no masses and bowel sounds normal  MS: no gross musculoskeletal defects noted, no edema    Diagnostic Test Results:  none     ASSESSMENT/PLAN:       ICD-10-CM    1. Migraine with aura and without status migrainosus, not intractable G43.109 SUMAtriptan (IMITREX) 100 MG tablet   2. CHEYENNE (generalized anxiety disorder) F41.1 ALPRAZolam (ALPRAZOLAM XR) 1 MG 24 hr tablet     ALPRAZolam (ALPRAZOLAM XR) 0.5 MG 24 hr tablet   3. Panic disorder F41.0 ALPRAZolam (ALPRAZOLAM XR) 1 MG 24 hr tablet     ALPRAZolam (ALPRAZOLAM XR) 0.5 MG 24 hr tablet   4. Mild intermittent asthma without complication J45.20    5. Hashimoto's thyroiditis E06.3    6. Hypothyroidism due to Hashimoto's thyroiditis E03.8     E06.3      New to this provider  She is going to a provider that compounds her T3/T4 hormones and " is also diagnosed with adrenal fatigue syndrome and per patient this has caused anxiety and she is managed by alprazolam by her psychiatrist for few years and recently he left the practice and she was told to come and get refills here, she has an appoint in May so only needs 2 refills. I agreed to give refills for only 2 times and needs to keep her appoint  Hypothyroidism- per outside provider  Migraines-stable  Mild asthma-stable, cont meds  Obesity-lost weight and is able to get off her hypertension meds,   Spent time reviewing her chart, previous notes and cleaning up her med list      Spent  46min greater than 50% of counseling and coordination of care for the conditions documented above.        See Patient Instructions    Aubrey Hsu DO  [facility]

## 2018-03-08 ASSESSMENT — ASTHMA QUESTIONNAIRES: ACT_TOTALSCORE: 25

## 2018-03-08 ASSESSMENT — ANXIETY QUESTIONNAIRES: GAD7 TOTAL SCORE: 2

## 2018-03-08 ASSESSMENT — PATIENT HEALTH QUESTIONNAIRE - PHQ9: SUM OF ALL RESPONSES TO PHQ QUESTIONS 1-9: 0

## 2018-04-01 DIAGNOSIS — F41.1 GAD (GENERALIZED ANXIETY DISORDER): ICD-10-CM

## 2018-04-01 DIAGNOSIS — F41.0 PANIC DISORDER: ICD-10-CM

## 2018-04-02 RX ORDER — ALPRAZOLAM 0.5 MG/1
TABLET, EXTENDED RELEASE ORAL
Qty: 30 TABLET | Refills: 0 | Status: SHIPPED | OUTPATIENT
Start: 2018-04-02

## 2018-04-02 RX ORDER — ALPRAZOLAM 1 MG/1
TABLET, EXTENDED RELEASE ORAL
Qty: 30 TABLET | Refills: 0 | Status: SHIPPED | OUTPATIENT
Start: 2018-04-02

## 2018-04-02 NOTE — TELEPHONE ENCOUNTER
New to the provider  I did let her know I am only comfortable doing one more refill for her  She has had one in march do please let her know to make this script last until her psych appoint, or needs to contact them if needing to get some prior to her visit or make other arrangements  Aubrey Hsu D.O.

## 2018-04-02 NOTE — TELEPHONE ENCOUNTER
Faxed to [facility]. Called & relayed message to patient & she verbalized understanding. She is scheduled on May 24th & she is on a cancel list. I encouraged her to think about other locations if she didn't think it would last & she says she has been to [facility] in [facility] past but had bad run ins with their other really sick patients.   Maki Abdalla RN

## 2018-04-02 NOTE — TELEPHONE ENCOUNTER
ALPRAZolam (ALPRAZOLAM XR) 0.5 MG 24 hr tablet      Last Written Prescription Date:  3/7/2018  Last Fill Quantity: 30 tablet,   # refills: 0  Last Office Visit: 3/7/2018  w/ Shadi, H    Future Office visit:       Routing refill request to provider for review/approval because:  Drug not on the [facility], [facility] or  Health refill protocol or controlled substance          ALPRAZolam (ALPRAZOLAM XR) 1 MG 24 hr tablet      Last Written Prescription Date:  3/7/2018  Last Fill Quantity: 30 tablet,   # refills: 0  Last Office Visit: 3/7/2018  w/ Shadi, [facility]    Future Office visit:       Routing refill request to provider for review/approval because:  Drug not on the [facility], [facility] or  Health refill protocol or controlled substance

## 2018-06-09 DIAGNOSIS — G43.109 MIGRAINE WITH AURA AND WITHOUT STATUS MIGRAINOSUS, NOT INTRACTABLE: ICD-10-CM

## 2018-06-11 RX ORDER — SUMATRIPTAN 100 MG/1
TABLET, FILM COATED ORAL
Qty: 9 TABLET | Refills: 2 | Status: SHIPPED | OUTPATIENT
Start: 2018-06-11 | End: 2018-08-07

## 2018-06-11 NOTE — TELEPHONE ENCOUNTER
"Requested Prescriptions   Pending Prescriptions Disp Refills     SUMAtriptan (IMITREX) 100 MG tablet [Pharmacy Med Name: SUMATRIPTAN 100MG TABLETS] 9 tablet 0     Sig: TAKE 1 TABLET BY MOUTH AT ONSET OF HEADACHE FOR MIGRAINE. MAY REPEAT IN 2 HOURS AS NEEDED. MAX OF 2 PER DAY    Serotonin Agonists Failed    6/9/2018  8:02 PM       Failed - Serotonin Agonist request needs review.    Please review patient's record. If patient has had 8 or more treatments in the past month, please forward to provider.         Passed - Blood pressure under 140/90 in past 12 months    BP Readings from Last 3 Encounters:   03/07/18 114/73   10/09/17 120/74   08/30/17 130/76                Passed - Recent (12 mo) or future (30 days) visit within the authorizing provider's specialty    Patient had office visit in the last 12 months or has a visit in the next 30 days with authorizing provider or within the authorizing provider's specialty.  See \"Patient Info\" tab in inbasket, or \"Choose Columns\" in Meds & Orders section of the refill encounter.           Passed - Patient is age 18 or older       Passed - No active pregnancy on record       Passed - No positive pregnancy test in past 12 months        Last Written Prescription Date:  3/7/17  Last Fill Quantity: 9,  # refills: 0   Last office visit: 3/7/2018 with prescribing provider:  JUL[facility]   Future Office Visit:      "

## 2018-08-07 ENCOUNTER — OFFICE VISIT (OUTPATIENT)
Dept: FAMILY MEDICINE | Facility: CLINIC | Age: 43
End: 2018-08-07
Payer: COMMERCIAL

## 2018-08-07 VITALS
TEMPERATURE: 98.3 F | HEART RATE: 92 BPM | DIASTOLIC BLOOD PRESSURE: 86 MMHG | WEIGHT: 171 LBS | HEIGHT: 64 IN | BODY MASS INDEX: 29.19 KG/M2 | SYSTOLIC BLOOD PRESSURE: 130 MMHG

## 2018-08-07 DIAGNOSIS — J45.20 INTERMITTENT ASTHMA, UNCOMPLICATED: ICD-10-CM

## 2018-08-07 DIAGNOSIS — S32.10XD CLOSED FRACTURE OF SACRUM WITH ROUTINE HEALING, UNSPECIFIED FRACTURE MORPHOLOGY, SUBSEQUENT ENCOUNTER: Primary | ICD-10-CM

## 2018-08-07 DIAGNOSIS — Z13.5 SCREENING FOR DIABETIC RETINOPATHY: ICD-10-CM

## 2018-08-07 DIAGNOSIS — G43.109 MIGRAINE WITH AURA AND WITHOUT STATUS MIGRAINOSUS, NOT INTRACTABLE: ICD-10-CM

## 2018-08-07 DIAGNOSIS — N94.9 ADNEXAL CYST: ICD-10-CM

## 2018-08-07 PROCEDURE — 99214 OFFICE O/P EST MOD 30 MIN: CPT | Performed by: FAMILY MEDICINE

## 2018-08-07 RX ORDER — HYDROXYZINE PAMOATE 25 MG/1
25 CAPSULE ORAL 3 TIMES DAILY PRN
Qty: 30 CAPSULE | Refills: 0 | Status: SHIPPED | OUTPATIENT
Start: 2018-08-07

## 2018-08-07 RX ORDER — ALBUTEROL SULFATE 90 UG/1
AEROSOL, METERED RESPIRATORY (INHALATION)
Qty: 18 G | Refills: 1 | Status: SHIPPED | OUTPATIENT
Start: 2018-08-07 | End: 2018-10-30

## 2018-08-07 RX ORDER — SUMATRIPTAN 100 MG/1
TABLET, FILM COATED ORAL
Qty: 9 TABLET | Refills: 2 | Status: SHIPPED | OUTPATIENT
Start: 2018-08-07

## 2018-08-07 ASSESSMENT — ANXIETY QUESTIONNAIRES
1. FEELING NERVOUS, ANXIOUS, OR ON EDGE: NOT AT ALL
3. WORRYING TOO MUCH ABOUT DIFFERENT THINGS: NOT AT ALL
GAD7 TOTAL SCORE: 0
6. BECOMING EASILY ANNOYED OR IRRITABLE: NOT AT ALL
7. FEELING AFRAID AS IF SOMETHING AWFUL MIGHT HAPPEN: NOT AT ALL
5. BEING SO RESTLESS THAT IT IS HARD TO SIT STILL: NOT AT ALL
IF YOU CHECKED OFF ANY PROBLEMS ON THIS QUESTIONNAIRE, HOW DIFFICULT HAVE THESE PROBLEMS MADE IT FOR YOU TO DO YOUR WORK, TAKE CARE OF THINGS AT HOME, OR GET ALONG WITH OTHER PEOPLE: NOT DIFFICULT AT ALL
2. NOT BEING ABLE TO STOP OR CONTROL WORRYING: NOT AT ALL

## 2018-08-07 ASSESSMENT — PATIENT HEALTH QUESTIONNAIRE - PHQ9: 5. POOR APPETITE OR OVEREATING: NOT AT ALL

## 2018-08-07 NOTE — MR AVS SNAPSHOT
After Visit Summary   8/7/2018    Johnna Severson    MRN: [id number]           Patient Information     Date Of Birth          1975        Visit Information        Provider Department      8/7/2018 1:40 PM [facility]        Today's Diagnoses     Adnexal cyst    -  1    Screening for diabetic retinopathy        Migraine with aura and without status migrainosus, not intractable        Intermittent asthma, uncomplicated        Closed fracture of sacrum with routine healing, unspecified fracture morphology, subsequent encounter          Care Instructions    Pain meds as planned  US as planned in 6 weeks  Advised follow up with us if doing well in 4 - 6 weeks  If having pain still then follow up with sports med as advised for potentially imaging test  Aubrey Hsu D.O.      [facility]   Discharged by : Amira SALAZAR CMA ([facility])    Paper scripts provided to patient : Tylenol #3, Imitrex      If you have any questions regarding your visit please contact your care team:     Team Gold                Clinic Hours Telephone Number     Dr. Gemini Azevedo, CNP 7am-7pm  Monday - Thursday   7am-5pm  Fridays  [phone]   (Appointment scheduling available 24/7)     RN Line  [phone] option 2     Urgent Care - [place] - 11am-9pm Monday-Friday Saturday-Sunday- 9am-5pm     [place] -   5pm-9pm Monday-Friday Saturday-Sunday- 9am-5pm    [phone] - [place]    [phone] - [place]       For a Price Quote for your services, please call our Consumer Price Line at [phone].     What options do I have for visits at the clinic other than the traditional office visit?     To expand how we care for you, many of our providers are utilizing electronic visits (e-visits) and telephone visits, when medically appropriate, for interactions with their  patients rather than a visit in the clinic. We also offer nurse visits for many medical concerns. Just like any other service, we will bill your insurance company for this type of visit based on time spent on the phone with your provider. Not all insurance companies cover these visits. Please check with your medical insurance if this type of visit is covered. You will be responsible for any charges that are not paid by your insurance.   E-visits via GPX Softwarehart: generally incur a $35.00 fee.     Telephone visits:  Time spent on the phone: *charged based on time that is spent on the phone in increments of 10 minutes. Estimated cost:   5-10 mins $30.00   11-20 mins. $59.00   21-30 mins. $85.00       Use WISErg (secure email communication and access to your chart) to send your primary care provider a message or make an appointment. Ask someone on your Team how to sign up for WISErg.     As always, Thank you for trusting us with your health care needs!      [facility]:    Scheduling Appointments  [place] [place]  Call: [phone]    [facility]  Call: [phone]    [facility]  Call: [phone]    For Gastroenterology referrals   [facility] Gastroenterology   Clinics and Surgery Center, 4th Floor   [address] [place] [address]   Appointments: [phone]    WHERE TO GO FOR CARE?  Clinic    Make an appointment if you:       Are sick (cold, cough, flu, sore throat, earache or in pain).       Have a small injury (sprain, small cut, burn or broken bone).       Need a physical exam, Pap smear, vaccine or prescription refill.       Have questions about your health or medicines.    To reach us:      Call [phone] ([phone]). Open 24 hours every day. (For counseling services, call [phone].)    Log into WISErg at [email or web address]. (Visit [email or web address] to create an account.) Hospital emergency  room    An emergency is a serious or life- threatening problem that must be treated right away.    Call 911 or get to the hospital if you have:      Very bad or sudden:            - Chest pain or pressure         - Bleeding         - Head or belly pain         - Dizziness or trouble seeing, walking or                          Speaking      Problems breathing      Blood in your vomit or you are coughing up blood      A major injury (knocked out, loss of a finger or limb, rape, broken bone protruding from skin)    A mental health crisis. (Or call the Mental Health Crisis line at 1-977.464.4210 or Suicide Prevention Hotline at 1-510.196.9064.)    Open 24 hours every day. You don't need an appointment.     Urgent care    Visit urgent care for sickness or small injuries when the clinic is closed. You don't need an appointment. To check hours or find an urgent care near you, visit [email or web address]. Online care    Get online care from [facility] for more than 70 common problems, like colds, allergies and infections. Open 24 hours every day at:   [email or web address]   Need help deciding?    For advice about where to be seen, you may call your clinic and ask to speak with a nurse. We're here for you 24 hours every day.         If you are deaf or hard of hearing, please let us know. We provide many free services including sign language interpreters, oral interpreters, TTYs, telephone amplifiers, note takers and written materials.                   Follow-ups after your visit        Future tests that were ordered for you today     Open Future Orders        Priority Expected Expires Ordered    US Pelvic Complete w Transvaginal Routine  8/7/2019 8/7/2018            Who to contact     If you have questions or need follow up information about today's clinic visit or your schedule please contact [facility] directly at [phone].  Normal or non-critical lab and imaging results will be communicated to you by Michael  "letter or phone within 4 business days after the clinic has received the results. If you do not hear from us within 7 days, please contact the clinic through DanceOn or phone. If you have a critical or abnormal lab result, we will notify you by phone as soon as possible.  Submit refill requests through DanceOn or call your pharmacy and they will forward the refill request to us. Please allow 3 business days for your refill to be completed.          Additional Information About Your Visit        ENT SurgicalharPlaced Information     DanceOn gives you secure access to your electronic health record. If you see a primary care provider, you can also send messages to your care team and make appointments. If you have questions, please call your primary care clinic.  If you do not have a primary care provider, please call [phone] and they will assist you.        Care EveryWhere ID     This is your Care EveryWhere ID. This could be used by other organizations to access your [place] medical records  [id number]        Your Vitals Were     Pulse Temperature Height Breastfeeding? BMI (Body Mass Index)       92 98.3  F (36.8  C) (Oral) 5' 4\" (1.626 m) No 29.35 kg/m2        Blood Pressure from Last 3 Encounters:   08/07/18 130/86   03/07/18 114/73   10/09/17 120/74    Weight from Last 3 Encounters:   08/07/18 171 lb (77.6 kg)   03/07/18 166 lb (75.3 kg)   10/09/17 164 lb (74.4 kg)                 Today's Medication Changes          These changes are accurate as of 8/7/18  2:39 PM.  If you have any questions, ask your nurse or doctor.               Start taking these medicines.        Dose/Directions    acetaminophen-codeine 300-30 MG per tablet   Commonly known as:  TYLENOL #3   Used for:  Closed fracture of sacrum with routine healing, unspecified fracture morphology, subsequent encounter   Started by:  Aubrey Hsu DO        Dose:  1-2 tablet   Take 1-2 tablets by mouth every 6 hours as needed for severe pain   Quantity:  " 30 tablet   Refills:  0       hydrOXYzine 25 MG capsule   Commonly known as:  VISTARIL   Used for:  Closed fracture of sacrum with routine healing, unspecified fracture morphology, subsequent encounter   Started by:  Aubrey Hsu DO        Dose:  25 mg   Take 1 capsule (25 mg) by mouth 3 times daily as needed for itching   Quantity:  30 capsule   Refills:  0            Where to get your medicines      These medications were sent to [id number] - [place], [place] [id number] [facility] [address] [place] [address]  [address] [facility] [address], [place] [address]     Phone:  [phone]     albuterol 108 (90 Base) MCG/ACT Inhaler    hydrOXYzine 25 MG capsule         Some of these will need a paper prescription and others can be bought over the counter.  Ask your nurse if you have questions.     Bring a paper prescription for each of these medications     acetaminophen-codeine 300-30 MG per tablet    SUMAtriptan 100 MG tablet               Information about OPIOIDS     PRESCRIPTION OPIOIDS: WHAT YOU NEED TO KNOW   We gave you an opioid (narcotic) pain medicine. It is important to manage your pain, but opioids are not always the best choice. You should first try all the other options your care team gave you. Take this medicine for as short a time (and as few doses) as possible.    Some activities can increase your pain, such as bandage changes or therapy sessions. It may help to take your pain medicine 30 to 60 minutes before these activities. Reduce your stress by getting enough sleep, working on hobbies you enjoy and practicing relaxation or meditation. Talk to your care team about ways to manage your pain beyond prescription opioids.    These medicines have risks:    DO NOT drive when on new or higher doses of pain medicine. These medicines can affect your alertness and reaction times, and you could be arrested for driving under the influence (DUI). If you need to use opioids long-term, talk to your  care team about driving.    DO NOT operate heavy machinery    DO NOT do any other dangerous activities while taking these medicines.    DO NOT drink any alcohol while taking these medicines.     If the opioid prescribed includes acetaminophen, DO NOT take with any other medicines that contain acetaminophen. Read all labels carefully. Look for the word  acetaminophen  or  Tylenol.  Ask your pharmacist if you have questions or are unsure.    You can get addicted to pain medicines, especially if you have a history of addiction (chemical, alcohol or substance dependence). Talk to your care team about ways to reduce this risk.    All opioids tend to cause constipation. Drink plenty of water and eat foods that have a lot of fiber, such as fruits, vegetables, prune juice, apple juice and high-fiber cereal. Take a laxative (Miralax, milk of magnesia, Colace, Senna) if you don t move your bowels at least every other day. Other side effects include upset stomach, sleepiness, dizziness, throwing up, tolerance (needing more of the medicine to have the same effect), physical dependence and slowed breathing.    Store your pills in a secure place, locked if possible. We will not replace any lost or stolen medicine. If you don t finish your medicine, please throw away (dispose) as directed by your pharmacist. The [place] Pollution Control Agency has more information about safe disposal: https://www.pca.[facility].mn.us/living-green/managing-unwanted-medications         Primary Care Provider Office Phone # Fax #    Aubrey Hsu  [phone]       [address] [place] [address]        Equal Access to Services     MADIYSN RECINOS : Hadii aad ku hadasho Soomaali, waaxda luqadaha, qaybta kaalmada adeegyada, janine blackman . So [facility] [phone].    ATENCIÓN: Si habla español, tiene a montaoñ disposición servicios gratuitos de asistencia lingüística. Llame al [phone].    We  comply with applicable federal civil rights laws and [place] laws. We do not discriminate on the basis of race, color, national origin, age, disability, sex, sexual orientation, or gender identity.            Thank you!     Thank you for choosing [facility]  for your care. Our goal is always to provide you with excellent care. Hearing back from our patients is one way we can continue to improve our services. Please take a few minutes to complete the written survey that you may receive in the mail after your visit with us. Thank you!             Your Updated Medication List - Protect others around you: Learn how to safely use, store and throw away your medicines at www.disposemymeds.org.          This list is accurate as of 8/7/18  2:39 PM.  Always use your most recent med list.                   Brand Name Dispense Instructions for use Diagnosis    acetaminophen-codeine 300-30 MG per tablet    TYLENOL #3    30 tablet    Take 1-2 tablets by mouth every 6 hours as needed for severe pain    Closed fracture of sacrum with routine healing, unspecified fracture morphology, subsequent encounter       albuterol 108 (90 Base) MCG/ACT Inhaler    VENTOLIN HFA    18 g    INHALE 2 PUFFS INTO THE LUNGS EVERY 6 HOURS AS NEEDED FOR SHORTNESS OF BREATH    Intermittent asthma, uncomplicated       * ALPRAZolam 1 MG 24 hr tablet    XANAX XR    30 tablet    TAKE 1 TABLET BY MOUTH EVERY MORNING    CHEYENNE (generalized anxiety disorder), Panic disorder       * ALPRAZolam 0.5 MG 24 hr tablet    XANAX XR    30 tablet    TAKE 1 TABLET BY MOUTH AT BEDTIME    CHEYENNE (generalized anxiety disorder), Panic disorder       B COMPLEX PO      Take  by mouth.        cholecalciferol 5000 units Caps capsule    vitamin D3     Take by mouth daily        cyclobenzaprine 10 MG tablet    FLEXERIL    90 tablet    Take 1 tablet (10 mg) by mouth 3 times daily as needed for muscle spasms    Fibromyalgia       hydrOXYzine 25 MG capsule    VISTARIL     30 capsule    Take 1 capsule (25 mg) by mouth 3 times daily as needed for itching    Closed fracture of sacrum with routine healing, unspecified fracture morphology, subsequent encounter       NASONEX 50 MCG/ACT spray   Generic drug:  mometasone      Spray 2 sprays into both nostrils daily.        order for DME     960 mL    Magnesium chloride 12.5% solution.  Take 2-4 tablespoons daily.  Mix in juice or water before taking.  Keep in refrigerator.    Chronic migraine without aura without status migrainosus, not intractable       PROBIOTIC PO      Take  by mouth.        PROGESTERONE PO      Take 200 mg by mouth Take one capsule by mouth at bedtime on days 10-28 of cycle. Stop if period starts.        SUMAtriptan 100 MG tablet    IMITREX    9 tablet    TAKE 1 TABLET BY MOUTH AT ONSET OF HEADACHE FOR MIGRAINE. MAY REPEAT IN 2 HOURS AS NEEDED. MAX OF 2 PER DAY    Migraine with aura and without status migrainosus, not intractable       UNABLE TO FIND      MEDICATION NAME: Triiodo-L-Thyroninr (T3) SR 10 mcg capsule - take one capsule by mouth in early afternoon        UNABLE TO FIND     1 each    MEDICATION NAME: T4 40 MCG/T3 18 MCG capsule - take one capsule by mouth in the morning on empty stomach        * Notice:  This list has 2 medication(s) that are the same as other medications prescribed for you. Read the directions carefully, and ask your doctor or other care provider to review them with you.

## 2018-08-07 NOTE — PATIENT INSTRUCTIONS
Pain meds as planned  US as planned in 6 weeks  Advised follow up with us if doing well in 4 - 6 weeks  If having pain still then follow up with sports med as advised for potentially imaging test  Aubrey Hsu D.O.      [facility]   Discharged by : Amira SALAZAR CMA ([facility])    Paper scripts provided to patient : Tylenol #3, Imitrex      If you have any questions regarding your visit please contact your care team:     Team Gold                Clinic Hours Telephone Number     Dr. Gemini Azevedo, CNP 7am-7pm  Monday - Thursday   7am-5pm  Fridays  [phone]   (Appointment scheduling available 24/7)     RN Line  [phone] option 2     Urgent Care - [place] and [place] - 11am-9pm Monday-Friday Saturday-Sunday- 9am-5pm     [place] -   5pm-9pm Monday-Friday Saturday-Sunday- 9am-5pm    [phone] - [place]    [phone] - [place]       For a Price Quote for your services, please call our Consumer Price Line at [phone].     What options do I have for visits at the clinic other than the traditional office visit?     To expand how we care for you, many of our providers are utilizing electronic visits (e-visits) and telephone visits, when medically appropriate, for interactions with their patients rather than a visit in the clinic. We also offer nurse visits for many medical concerns. Just like any other service, we will bill your insurance company for this type of visit based on time spent on the phone with your provider. Not all insurance companies cover these visits. Please check with your medical insurance if this type of visit is covered. You will be responsible for any charges that are not paid by your insurance.   E-visits via 3Pillar [employer]: generally incur a $35.00 fee.     Telephone visits:  Time spent on the phone: *charged based on time that is spent on the phone in increments of 10 minutes.  Estimated cost:   5-10 mins $30.00   11-20 mins. $59.00   21-30 mins. $85.00       Use What's in My Handbag (secure email communication and access to your chart) to send your primary care provider a message or make an appointment. Ask someone on your Team how to sign up for What's in My Handbag.     As always, Thank you for trusting us with your health care needs!      [facility]:    Scheduling Appointments  [facility]  Call: [phone]    [facility]  Call: [phone]    [facility]  Call: [phone]    For Gastroenterology referrals   [facility] Gastroenterology   Clinics and Surgery Center, 4th Floor   [address] [place] [address]   Appointments: [phone]    WHERE TO GO FOR CARE?  Clinic    Make an appointment if you:       Are sick (cold, cough, flu, sore throat, earache or in pain).       Have a small injury (sprain, small cut, burn or broken bone).       Need a physical exam, Pap smear, vaccine or prescription refill.       Have questions about your health or medicines.    To reach us:      Call [phone] ([phone]). Open 24 hours every day. (For counseling services, call [phone].)    Log into What's in My Handbag at [email or web address]. (Visit [email or web address] to create an account.) Hospital emergency room    An emergency is a serious or life- threatening problem that must be treated right away.    Call 657 or get to the hospital if you have:      Very bad or sudden:            - Chest pain or pressure         - Bleeding         - Head or belly pain         - Dizziness or trouble seeing, walking or                          Speaking      Problems breathing      Blood in your vomit or you are coughing up blood      A major injury (knocked out, loss of a finger or limb, rape, broken bone protruding from skin)    A mental health crisis. (Or call the Mental Health Crisis line at 1-729.237.8239 or Suicide Prevention  Hotline at 1-986.683.9571.)    Open 24 hours every day. You don't need an appointment.     Urgent care    Visit urgent care for sickness or small injuries when the clinic is closed. You don't need an appointment. To check hours or find an urgent care near you, visit [email or web address]. Online care    Get online care from [facility] for more than 70 common problems, like colds, allergies and infections. Open 24 hours every day at:   [email or web address]   Need help deciding?    For advice about where to be seen, you may call your clinic and ask to speak with a nurse. We're here for you 24 hours every day.         If you are deaf or hard of hearing, please let us know. We provide many free services including sign language interpreters, oral interpreters, TTYs, telephone amplifiers, note takers and written materials.            Hypothyroidism

## 2018-08-07 NOTE — PROGRESS NOTES
SUBJECTIVE:   Johnna Severson is a 42 year old female who presents to clinic today for the following health issues:      Depression and Anxiety Follow-Up    Status since last visit: Improved     Other associated symptoms:None    Complicating factors:     Significant life event: Yes-  ER visit     Current substance abuse: None    PHQ-9 10/9/2017 3/7/2018 8/7/2018   Total Score 3 0 0   Q9: Suicide Ideation Not at all Not at all Not at all     CHEYENNE-7 SCORE 3/13/2017 3/7/2018 8/7/2018   Total Score - - -   Total Score - - -   Total Score 2 2 0       PHQ-9  English  PHQ-9   Any Language  CHEYENNE-7  Suicide Assessment Five-step Evaluation and Treatment (SAFE-T)  Asthma Follow-Up    Was ACT completed today?    Yes    ACT Total Scores 8/7/2018   ACT TOTAL SCORE -   ASTHMA ER VISITS -   ASTHMA HOSPITALIZATIONS -   ACT TOTAL SCORE (Goal Greater than or Equal to 20) 24   In the past 12 months, how many times did you visit the emergency room for your asthma without being admitted to the hospital? 0   In the past 12 months, how many times were you hospitalized overnight because of your asthma? 0       Recent asthma triggers that patient is dealing with: None        Amount of exercise or physical activity: None    Problems taking medications regularly: No    Medication side effects: none    Diet: regular (no restrictions)      ED/UC Followup:    Facility:  [facility]  Date of visit: 7/30/2018  Reason for visit: sacrum fracture  Current Status: still in pain, would like to discuss images      CT Pelvis (without contrast):  Impression:   1.  Minimally displaced/angulated acute fracture of the S5 segment of the sacrum, just above the sacrococcygeal junction.  2.  Cystic mass in the right pelvis, measuring 6.2 x 4.4 x 4.4 cm. Recommend follow-up ultrasound in 6-12 weeks for follow-up evaluation/complete characterization.  Report per radiology.           Problem list and histories reviewed & adjusted, as indicated.  Additional  history: as documented    Patient Active Problem List   Diagnosis     Depression, major     Migraine headache     CHEYENNE (generalized anxiety disorder)     CARDIOVASCULAR SCREENING; LDL GOAL LESS THAN 160     Intermittent asthma     Vitamin D deficiency     Elevated LFTs     Hypertension goal BP (blood pressure) < 140/90     Encounter for IUD insertion     Zinc deficiency     IBS (irritable bowel syndrome)     Lactose intolerance     Nonallergic rhinitis     Diagnostic skin and sensitization tests     Keratosis pilaris     Other specified hypothyroidism     Fibromyalgia     Migraine with aura and without status migrainosus, not intractable     Iron deficiency anemia, unspecified iron deficiency anemia type     Hypothyroidism due to Hashimoto's thyroiditis     Past Surgical History:   Procedure Laterality Date     BIOPSY      Cervical and skin punch not sure on dates. done at [place].     CRYOTHERAPY, CERVICAL  2002       Social History   Substance Use Topics     Smoking status: Former Smoker     Packs/day: 0.50     Years: 10.00     Types: Cigarettes     Quit date: 8/18/2002     Smokeless tobacco: Former User     Alcohol use No     Family History   Problem Relation Age of Onset     Diabetes Maternal Grandfather      Cancer Maternal Grandfather      brain     Cardiovascular Maternal Grandmother      carotid stenosis, CHF     Hypertension Maternal Grandmother      Anxiety Disorder Maternal Grandmother      Thyroid Disease Maternal Grandmother      Lipids Brother      Allergies Brother      Hypertension Mother      Allergies Mother      Arthritis Mother      Other - See Comments Mother      Mangioma removed Sept 2015     Anxiety Disorder Mother      Diabetes Father      C.A.D. Father      CABG, stents, 52     HEART DISEASE Father      Alcohol/Drug Paternal Grandfather      Thyroid Disease Maternal Aunt      Cancer Other 41     uterine      Other Cancer Paternal Grandmother      Lung and Basel cell skin     Cancer Maternal  "Aunt      ov     Cancer Other      maternal cousin     Diabetes Other      Other Cancer Other      Diabetes Other      Hypertension Other      Anxiety Disorder Other      Anxiety Disorder Cousin      Thyroid Disease Cousin            Reviewed and updated as needed this visit by clinical staff  Tobacco  Allergies  Meds  Med Hx  Surg Hx  Fam Hx  Soc Hx      Reviewed and updated as needed this visit by Provider         ROS:  Constitutional, HEENT, cardiovascular, pulmonary, GI, , musculoskeletal, neuro, skin, endocrine and psych systems are negative, except as otherwise noted.    OBJECTIVE:     /86  Pulse 92  Temp 98.3  F (36.8  C) (Oral)  Ht 5' 4\" (1.626 m)  Wt 171 lb (77.6 kg)  Breastfeeding? No  BMI 29.35 kg/m2  Body mass index is 29.35 kg/(m^2).     GENERAL: healthy, alert and in mild distress when seated but better with standing  HENT: ear canals and TM's normal, nose and mouth without ulcers or lesions  RESP: lungs clear to auscultation - no rales, rhonchi or wheezes  CV: regular rate and rhythm, normal S1 S2, no S3 or S4, no murmur, click or rub, no peripheral edema and peripheral pulses strong  ABDOMEN: soft, nontender, no hepatosplenomegaly, no masses and bowel sounds normal  MS: patient is uncomfortable and is standing up due to pain with seats and range of motion, declined exam,    NEURO: Normal strength and tone, mentation intact and speech normal    Diagnostic Test Results:  none     ASSESSMENT/PLAN:       ICD-10-CM    1. Closed fracture of sacrum with routine healing, unspecified fracture morphology, subsequent encounter S32.10XD acetaminophen-codeine (TYLENOL #3) 300-30 MG per tablet     hydrOXYzine (VISTARIL) 25 MG capsule   2. Migraine with aura and without status migrainosus, not intractable G43.109 SUMAtriptan (IMITREX) 100 MG tablet   3. Adnexal cyst N94.9 US Pelvic Complete w Transvaginal   4. Intermittent asthma, uncomplicated J45.20 albuterol (VENTOLIN HFA) 108 (90 Base) " MCG/ACT Inhaler   5. Screening for diabetic retinopathy Z13.5      Patient here after an ER visit for sacral fracture.  Patient reports that her pain could not be controlled with Vicodin or Percocet due to side effects of the meds.  She has persisting nausea and dizziness after taking those narcotics.  Reviewed risks and benefits of Tylenol 3 and tramadol.  Patient will like to try Tylenol 3 with alternating ibuprofen and regular Tylenol.  Reviewed CT scan reports from the ER with sports medicine Dr. Honeycutt who recommended no follow-up imaging tests unless clinically patient is not improving.  Patient is to use the doughnut pads  CT showed adnexal cyst-follow up Ultrasound in 3 months  Follow up for receck in 6 weeks , sooner if not resolving  Discussed following up with sports med if not improving or if worsening   Depression-sees psych  Asthma-mild stable  History of migraines-stable, refilled med  Spent greater than 45 min with  50% of counseling and coordination of care for the conditions documented above.    See Patient Instructions    Aubrey Hsu DO  [facility]

## 2018-08-08 ASSESSMENT — ASTHMA QUESTIONNAIRES: ACT_TOTALSCORE: 24

## 2018-08-08 ASSESSMENT — ANXIETY QUESTIONNAIRES: GAD7 TOTAL SCORE: 0

## 2018-08-08 ASSESSMENT — PATIENT HEALTH QUESTIONNAIRE - PHQ9: SUM OF ALL RESPONSES TO PHQ QUESTIONS 1-9: 0

## 2018-10-23 ENCOUNTER — RADIANT APPOINTMENT (OUTPATIENT)
Dept: ULTRASOUND IMAGING | Facility: CLINIC | Age: 43
End: 2018-10-23
Attending: FAMILY MEDICINE
Payer: COMMERCIAL

## 2018-10-23 DIAGNOSIS — N94.9 ADNEXAL CYST: ICD-10-CM

## 2018-10-23 PROCEDURE — 76830 TRANSVAGINAL US NON-OB: CPT

## 2018-10-23 PROCEDURE — 76856 US EXAM PELVIC COMPLETE: CPT

## 2018-10-30 DIAGNOSIS — J45.20 INTERMITTENT ASTHMA, UNCOMPLICATED: ICD-10-CM

## 2018-10-30 RX ORDER — ALBUTEROL SULFATE 90 UG/1
AEROSOL, METERED RESPIRATORY (INHALATION)
Qty: 18 G | Refills: 3 | Status: SHIPPED | OUTPATIENT
Start: 2018-10-30

## 2018-10-30 NOTE — TELEPHONE ENCOUNTER
"Requested Prescriptions   Pending Prescriptions Disp Refills     VENTOLIN  (90 Base) MCG/ACT inhaler [Pharmacy Med Name: VENTOLIN HFA INH W/DOS CTR [id number]]  Last Written Prescription Date:  8/7/2018  Last Fill Quantity: 18 g,  # refills: 1   Last office visit: 8/7/2018 with prescribing provider:  Shadi   Future Office Visit:     18 g 0     Sig: INHALE 2 PUFFS INTO THE LUNGS EVERY 6 HOURS AS NEEDED FOR SHORTNESS OF BREATH    Asthma Maintenance Inhalers - Anticholinergics Passed    10/30/2018 10:06 AM       Passed - Patient is age 12 years or older       Passed - Asthma control assessment score within normal limits in last 6 months    Please review ACT score.          Passed - Recent (6 mo) or future (30 days) visit within the authorizing provider's specialty    Patient had office visit in the last 6 months or has a visit in the next 30 days with authorizing provider or within the authorizing provider's specialty.  See \"Patient Info\" tab in inbasket, or \"Choose Columns\" in Meds & Orders section of the refill encounter.              "

## 2018-11-16 ENCOUNTER — OFFICE VISIT (OUTPATIENT)
Dept: FAMILY MEDICINE | Facility: CLINIC | Age: 43
End: 2018-11-16
Payer: COMMERCIAL

## 2018-11-16 VITALS
BODY MASS INDEX: 29.71 KG/M2 | TEMPERATURE: 98.6 F | HEART RATE: 80 BPM | SYSTOLIC BLOOD PRESSURE: 124 MMHG | WEIGHT: 174 LBS | HEIGHT: 64 IN | DIASTOLIC BLOOD PRESSURE: 76 MMHG | OXYGEN SATURATION: 99 %

## 2018-11-16 DIAGNOSIS — H73.92 ABNORMAL TYMPANIC MEMBRANE OF LEFT EAR: ICD-10-CM

## 2018-11-16 DIAGNOSIS — B37.31 YEAST INFECTION OF THE VAGINA: ICD-10-CM

## 2018-11-16 DIAGNOSIS — J01.01 ACUTE RECURRENT MAXILLARY SINUSITIS: Primary | ICD-10-CM

## 2018-11-16 PROCEDURE — 99213 OFFICE O/P EST LOW 20 MIN: CPT | Performed by: FAMILY MEDICINE

## 2018-11-16 RX ORDER — FLUCONAZOLE 150 MG/1
150 TABLET ORAL ONCE
Qty: 1 TABLET | Refills: 1 | Status: SHIPPED | OUTPATIENT
Start: 2018-11-16 | End: 2019-05-08

## 2018-11-16 RX ORDER — FLUTICASONE PROPIONATE 50 MCG
1-2 SPRAY, SUSPENSION (ML) NASAL DAILY
Qty: 1 BOTTLE | Refills: 0 | Status: SHIPPED | OUTPATIENT
Start: 2018-11-16

## 2018-11-16 RX ORDER — AMOXICILLIN 875 MG
875 TABLET ORAL 2 TIMES DAILY
Qty: 20 TABLET | Refills: 0 | Status: SHIPPED | OUTPATIENT
Start: 2018-11-16

## 2018-11-16 NOTE — MR AVS SNAPSHOT
After Visit Summary   11/16/2018    Johnna Severson    MRN: [id number]           Patient Information     Date Of Birth          1975        Visit Information        Provider Department      11/16/2018 1:40 PM Aubrey Hsu DO [facility]        Today's Diagnoses     Acute recurrent maxillary sinusitis    -  1    Abnormal tympanic membrane of left ear        Yeast infection of the vagina           Follow-ups after your visit        Follow-up notes from your care team     Return in about 4 weeks (around 12/14/2018) for Physical Exam.      Who to contact     If you have questions or need follow up information about today's clinic visit or your schedule please contact [facility] directly at [phone].  Normal or non-critical lab and imaging results will be communicated to you by Clinithinkhart, letter or phone within 4 business days after the clinic has received the results. If you do not hear from us within 7 days, please contact the clinic through Clinithinkhart or phone. If you have a critical or abnormal lab result, we will notify you by phone as soon as possible.  Submit refill requests through Ante Up or call your pharmacy and they will forward the refill request to us. Please allow 3 business days for your refill to be completed.          Additional Information About Your Visit        MyChart Information     Ante Up gives you secure access to your electronic health record. If you see a primary care provider, you can also send messages to your care team and make appointments. If you have questions, please call your primary care clinic.  If you do not have a primary care provider, please call [phone] and they will assist you.        Care EveryWhere ID     This is your Care EveryWhere ID. This could be used by other organizations to access your [place] medical records  [id number]        Your Vitals Were     Pulse Temperature Height Pulse Oximetry BMI  "(Body Mass Index)       80 98.6  F (37  C) (Oral) 5' 4\" (1.626 m) 99% 29.87 kg/m2        Blood Pressure from Last 3 Encounters:   11/16/18 124/76   08/07/18 130/86   03/07/18 114/73    Weight from Last 3 Encounters:   11/16/18 174 lb (78.9 kg)   08/07/18 171 lb (77.6 kg)   03/07/18 166 lb (75.3 kg)              Today, you had the following     No orders found for display         Today's Medication Changes          These changes are accurate as of 11/16/18  2:01 PM.  If you have any questions, ask your nurse or doctor.               Start taking these medicines.        Dose/Directions    amoxicillin 875 MG tablet   Commonly known as:  AMOXIL   Used for:  Acute recurrent maxillary sinusitis, Abnormal tympanic membrane of left ear   Started by:  Aubrey Hsu DO        Dose:  875 mg   Take 1 tablet (875 mg) by mouth 2 times daily   Quantity:  20 tablet   Refills:  0       fluconazole 150 MG tablet   Commonly known as:  DIFLUCAN   Used for:  Yeast infection of the vagina   Started by:  Aubrey Hsu DO        Dose:  150 mg   Take 1 tablet (150 mg) by mouth once for 1 dose   Quantity:  1 tablet   Refills:  1       fluticasone 50 MCG/ACT spray   Commonly known as:  FLONASE   Used for:  Acute recurrent maxillary sinusitis   Started by:  Aubrey Hsu DO        Dose:  1-2 spray   Spray 1-2 sprays into both nostrils daily   Quantity:  1 Bottle   Refills:  0            Where to get your medicines      These medications were sent to [id number] [address], [place] [facility] [address]     Phone:  [phone]     amoxicillin 875 MG tablet    fluconazole 150 MG tablet    fluticasone 50 MCG/ACT spray                Primary Care Provider Office Phone # Fax #    Aubrey Hsu [phone]       [address] [place] [address]        Equal Access to Services     MADISYN RECINOS AH: Hadwillam " sameer Davidson, waedvinda luqadaha, qaybta kaalmada bao, janine anain hayaamillie briscoeseth jeaninethai laNabortasia aftab. So [facility] [phone].    ATENCIÓN: Si jaylinla dee, tiene a montaño disposición servicios gratuitos de asistencia lingüística. Vipul al [phone].    We comply with applicable federal civil rights laws and [place] laws. We do not discriminate on the basis of race, color, national origin, age, disability, sex, sexual orientation, or gender identity.            Thank you!     Thank you for choosing [facility]  for your care. Our goal is always to provide you with excellent care. Hearing back from our patients is one way we can continue to improve our services. Please take a few minutes to complete the written survey that you may receive in the mail after your visit with us. Thank you!             Your Updated Medication List - Protect others around you: Learn how to safely use, store and throw away your medicines at www.disposemymeds.org.          This list is accurate as of 11/16/18  2:01 PM.  Always use your most recent med list.                   Brand Name Dispense Instructions for use Diagnosis    acetaminophen-codeine 300-30 MG per tablet    TYLENOL #3    30 tablet    Take 1-2 tablets by mouth every 6 hours as needed for severe pain    Closed fracture of sacrum with routine healing, unspecified fracture morphology, subsequent encounter       * ALPRAZolam 1 MG 24 hr tablet    XANAX XR    30 tablet    TAKE 1 TABLET BY MOUTH EVERY MORNING    CHEYENNE (generalized anxiety disorder), Panic disorder       * ALPRAZolam 0.5 MG 24 hr tablet    XANAX XR    30 tablet    TAKE 1 TABLET BY MOUTH AT BEDTIME    CHEYENNE (generalized anxiety disorder), Panic disorder       amoxicillin 875 MG tablet    AMOXIL    20 tablet    Take 1 tablet (875 mg) by mouth 2 times daily    Acute recurrent maxillary sinusitis, Abnormal tympanic membrane of left ear       B COMPLEX PO      Take  by mouth.        cholecalciferol  5000 units (125 mcg) Caps capsule    vitamin D3     Take by mouth daily        cyclobenzaprine 10 MG tablet    FLEXERIL    90 tablet    Take 1 tablet (10 mg) by mouth 3 times daily as needed for muscle spasms    Fibromyalgia       fluconazole 150 MG tablet    DIFLUCAN    1 tablet    Take 1 tablet (150 mg) by mouth once for 1 dose    Yeast infection of the vagina       fluticasone 50 MCG/ACT spray    FLONASE    1 Bottle    Spray 1-2 sprays into both nostrils daily    Acute recurrent maxillary sinusitis       hydrOXYzine 25 MG capsule    VISTARIL    30 capsule    Take 1 capsule (25 mg) by mouth 3 times daily as needed for itching    Closed fracture of sacrum with routine healing, unspecified fracture morphology, subsequent encounter       NASONEX 50 MCG/ACT spray   Generic drug:  mometasone      Spray 2 sprays into both nostrils daily.        order for DME     960 mL    Magnesium chloride 12.5% solution.  Take 2-4 tablespoons daily.  Mix in juice or water before taking.  Keep in refrigerator.    Chronic migraine without aura without status migrainosus, not intractable       PROBIOTIC PO      Take  by mouth.        PROGESTERONE PO      Take 200 mg by mouth Take one capsule by mouth at bedtime on days 10-28 of cycle. Stop if period starts.        SUMAtriptan 100 MG tablet    IMITREX    9 tablet    TAKE 1 TABLET BY MOUTH AT ONSET OF HEADACHE FOR MIGRAINE. MAY REPEAT IN 2 HOURS AS NEEDED. MAX OF 2 PER DAY    Migraine with aura and without status migrainosus, not intractable       UNABLE TO FIND      MEDICATION NAME: Triiodo-L-Thyroninr (T3) SR 14 mcg capsule - take one capsule by mouth in early afternoon        UNABLE TO FIND     1 each    MEDICATION NAME: T4 48 MCG/T3 24 MCG capsule - take one capsule by mouth in the morning on empty stomach        VENTOLIN  (90 Base) MCG/ACT inhaler   Generic drug:  albuterol     18 g    INHALE 2 PUFFS INTO THE LUNGS EVERY 6 HOURS AS NEEDED FOR SHORTNESS OF BREATH    Intermittent  asthma, uncomplicated       * Notice:  This list has 2 medication(s) that are the same as other medications prescribed for you. Read the directions carefully, and ask your doctor or other care provider to review them with you.

## 2018-11-16 NOTE — PROGRESS NOTES
SUBJECTIVE:   Johnna Severson is a 43 year old female who presents to clinic today for the following health issues:      RESPIRATORY SYMPTOMS      Duration: 10 days     Description  facial pain/pressure, teeth pain, headache in front and now in back     Severity: moderate    Accompanying signs and symptoms: None    History (predisposing factors):  none    Precipitating or alleviating factors: None    Therapies tried and outcome:  Neti pot, hot steamy showers, advil      history of sinus issues  She has not been on antibiotics     Teeth pain  No fever  She has no ear pain    She does have history of yeast after antibiotics        Problem list and histories reviewed & adjusted, as indicated.  Additional history: as documented    Patient Active Problem List   Diagnosis     Depression, major     Migraine headache     CHEYENNE (generalized anxiety disorder)     CARDIOVASCULAR SCREENING; LDL GOAL LESS THAN 160     Intermittent asthma     Vitamin D deficiency     Elevated LFTs     Hypertension goal BP (blood pressure) < 140/90     Encounter for IUD insertion     Zinc deficiency     IBS (irritable bowel syndrome)     Lactose intolerance     Nonallergic rhinitis     Diagnostic skin and sensitization tests     Keratosis pilaris     Other specified hypothyroidism     Fibromyalgia     Migraine with aura and without status migrainosus, not intractable     Iron deficiency anemia, unspecified iron deficiency anemia type     Hypothyroidism due to Hashimoto's thyroiditis     Past Surgical History:   Procedure Laterality Date     BIOPSY      Cervical and skin punch not sure on dates. done at [place].     CRYOTHERAPY, CERVICAL  2002       Social History   Substance Use Topics     Smoking status: Former Smoker     Packs/day: 0.50     Years: 10.00     Types: Cigarettes     Quit date: 8/18/2002     Smokeless tobacco: Former User     Alcohol use No     Family History   Problem Relation Age of Onset     Diabetes Maternal Grandfather       "Cancer Maternal Grandfather      brain     Cardiovascular Maternal Grandmother      carotid stenosis, CHF     Hypertension Maternal Grandmother      Anxiety Disorder Maternal Grandmother      Thyroid Disease Maternal Grandmother      Lipids Brother      Allergies Brother      Hypertension Mother      Allergies Mother      Arthritis Mother      Other - See Comments Mother      Mangioma removed Sept 2015     Anxiety Disorder Mother      Diabetes Father      C.A.D. Father      CABG, stents, 52     HEART DISEASE Father      Alcohol/Drug Paternal Grandfather      Thyroid Disease Maternal Aunt      Cancer Other 41     uterine      Other Cancer Paternal Grandmother      Lung and Basel cell skin     Cancer Maternal Aunt      ov     Cancer Other      maternal cousin     Diabetes Other      Other Cancer Other      Diabetes Other      Hypertension Other      Anxiety Disorder Other      Anxiety Disorder Cousin      Thyroid Disease Cousin            Reviewed and updated as needed this visit by clinical staff       Reviewed and updated as needed this visit by Provider         ROS:  Constitutional, HEENT, cardiovascular, pulmonary, GI, , musculoskeletal, neuro, skin, endocrine and psych systems are negative, except as otherwise noted.    OBJECTIVE:     /76  Pulse 80  Temp 98.6  F (37  C) (Oral)  Ht 5' 4\" (1.626 m)  Wt 174 lb (78.9 kg)  SpO2 99%  BMI 29.87 kg/m2  Body mass index is 29.87 kg/(m^2).  GENERAL: healthy, alert and no distress  EYES: Eyes grossly normal to inspection, PERRL and conjunctivae and sclerae normal  HENT: ear canals and TM's normal on left side some erythema without , nose and mouth without ulcers or lesions  NECK: no adenopathy, no asymmetry, masses, or scars and thyroid normal to palpation  RESP: lungs clear to auscultation - no rales, rhonchi or wheezes  CV: regular rate and rhythm, normal S1 S2, no S3 or S4, no murmur, click or rub, no peripheral edema and peripheral pulses strong  ABDOMEN: " soft, nontender, no hepatosplenomegaly, no masses and bowel sounds normal  MS: no gross musculoskeletal defects noted, no edema    Diagnostic Test Results:  No results found for this or any previous visit (from the past 24 hour(s)).    ASSESSMENT/PLAN:       ICD-10-CM    1. Acute recurrent maxillary sinusitis J01.01 amoxicillin (AMOXIL) 875 MG tablet     fluticasone (FLONASE) 50 MCG/ACT spray   2. Abnormal tympanic membrane of left ear H73.92 amoxicillin (AMOXIL) 875 MG tablet   3. Yeast infection of the vagina B37.3 fluconazole (DIFLUCAN) 150 MG tablet       Sinusitis-antibiotics if not resolving with flonase  Abnormal TM-slightly pink  History of yeast after antibiotics    Follow up for annual visit in 3-4 weeks    See Patient Instructions    Aubrey Hsu DO  [facility]

## 2019-05-02 ENCOUNTER — TELEPHONE (OUTPATIENT)
Dept: FAMILY MEDICINE | Facility: CLINIC | Age: 44
End: 2019-05-02

## 2019-05-02 NOTE — TELEPHONE ENCOUNTER
Reason for call:  Patient reporting a symptom    Symptom or request:  Mole on back of left arm and has changed a lot in the past few months.  Patient is wondering if she should come in and see Dr Hsu or just get a referral to see someone else.    Duration (how long have symptoms been present): past few months    Have you been treated for this before? No    Additional comments:     Phone Number patient can be reached at:  Home number on file [phone] (home)    Best Time:  any    Can we leave a detailed message on this number:  YES    Call taken on 5/2/2019 at 12:17 PM by Maddy Contreras

## 2019-05-02 NOTE — TELEPHONE ENCOUNTER
Called patient back and recommended an appointment with PCP as the first step. Patient agreed and was scheduled for 5/8/19    Nori Radford RN

## 2019-05-08 ENCOUNTER — OFFICE VISIT (OUTPATIENT)
Dept: FAMILY MEDICINE | Facility: CLINIC | Age: 44
End: 2019-05-08
Payer: COMMERCIAL

## 2019-05-08 VITALS
WEIGHT: 168 LBS | HEIGHT: 64 IN | DIASTOLIC BLOOD PRESSURE: 70 MMHG | TEMPERATURE: 97.9 F | HEART RATE: 84 BPM | SYSTOLIC BLOOD PRESSURE: 122 MMHG | BODY MASS INDEX: 28.68 KG/M2

## 2019-05-08 DIAGNOSIS — E06.3 HYPOTHYROIDISM DUE TO HASHIMOTO'S THYROIDITIS: ICD-10-CM

## 2019-05-08 DIAGNOSIS — R21 RASH: ICD-10-CM

## 2019-05-08 DIAGNOSIS — I10 HYPERTENSION GOAL BP (BLOOD PRESSURE) < 140/90: ICD-10-CM

## 2019-05-08 DIAGNOSIS — A69.20 ERYTHEMA MIGRANS (LYME DISEASE): Primary | ICD-10-CM

## 2019-05-08 DIAGNOSIS — Z12.31 VISIT FOR SCREENING MAMMOGRAM: ICD-10-CM

## 2019-05-08 DIAGNOSIS — G43.109 MIGRAINE WITH AURA AND WITHOUT STATUS MIGRAINOSUS, NOT INTRACTABLE: ICD-10-CM

## 2019-05-08 LAB — HGB BLD-MCNC: 15.3 G/DL (ref 11.7–15.7)

## 2019-05-08 PROCEDURE — 80048 BASIC METABOLIC PNL TOTAL CA: CPT | Performed by: FAMILY MEDICINE

## 2019-05-08 PROCEDURE — 86618 LYME DISEASE ANTIBODY: CPT | Performed by: FAMILY MEDICINE

## 2019-05-08 PROCEDURE — 85018 HEMOGLOBIN: CPT | Performed by: FAMILY MEDICINE

## 2019-05-08 PROCEDURE — 36415 COLL VENOUS BLD VENIPUNCTURE: CPT | Performed by: FAMILY MEDICINE

## 2019-05-08 PROCEDURE — 99214 OFFICE O/P EST MOD 30 MIN: CPT | Performed by: FAMILY MEDICINE

## 2019-05-08 RX ORDER — DOXYCYCLINE 100 MG/1
100 CAPSULE ORAL 2 TIMES DAILY
Qty: 20 CAPSULE | Refills: 0 | Status: SHIPPED | OUTPATIENT
Start: 2019-05-08 | End: 2019-05-18

## 2019-05-08 RX ORDER — DOXYCYCLINE 100 MG/1
100 CAPSULE ORAL 2 TIMES DAILY
Qty: 20 CAPSULE | Refills: 0 | Status: CANCELLED | OUTPATIENT
Start: 2019-05-08 | End: 2019-05-18

## 2019-05-08 ASSESSMENT — MIFFLIN-ST. JEOR: SCORE: 1402.04

## 2019-05-08 ASSESSMENT — PATIENT HEALTH QUESTIONNAIRE - PHQ9: SUM OF ALL RESPONSES TO PHQ QUESTIONS 1-9: 0

## 2019-05-08 NOTE — LETTER
May 16, 2019      Johnna Severson  [birth year][address] [facility] [address]        Dear Annita,       The results of your recent lab tests were within normal limits. Enclosed is a copy of these results.  If you have any further questions or problems, please contact our office at [phone].      Sincerely,    Aubrey Hsu, DO   ap    Results for orders placed or performed in visit on 05/08/19   Basic metabolic panel   Result Value Ref Range    Sodium 139 133 - 144 mmol/L    Potassium 3.9 3.4 - 5.3 mmol/L    Chloride 109 94 - 109 mmol/L    Carbon Dioxide 21 20 - 32 mmol/L    Anion Gap 9 3 - 14 mmol/L    Glucose 90 70 - 99 mg/dL    Urea Nitrogen 18 7 - 30 mg/dL    Creatinine 0.68 0.52 - 1.04 mg/dL    GFR Estimate >90 >60 mL/min/[1.73_m2]    GFR Estimate If Black >90 >60 mL/min/[1.73_m2]    Calcium 9.0 8.5 - 10.1 mg/dL   Hemoglobin   Result Value Ref Range    Hemoglobin 15.3 11.7 - 15.7 g/dL   Lyme Disease Sharyn with reflex to WB Serum   Result Value Ref Range    Lyme Disease Antibodies Serum 0.11 0.00 - 0.89

## 2019-05-08 NOTE — PROGRESS NOTES
SUBJECTIVE:   Johnna Severson is a 43 year old female who presents to clinic today for the following   health issues:    Check Mole on arm. She thinks it was a tick bite. Back of left upper arm. It has a small [place] around the small wound, first noticed on May 1st.    Hypertension Follow-up      Outpatient blood pressures are not being checked.    Low Salt Diet: no added salt    Depression and Anxiety Follow-Up    Status since last visit: Improved , stopped xanax about 4 days ago,     Other associated symptoms:None    Complicating factors:     Significant life event: No     Current substance abuse: None    PHQ 3/7/2018 8/7/2018 5/8/2019   PHQ-9 Total Score 0 0 0   Q9: Thoughts of better off dead/self-harm past 2 weeks Not at all Not at all Not at all     CHEYENNE-7 SCORE 3/13/2017 3/7/2018 8/7/2018   Total Score - - -   Total Score - - -   Total Score 2 2 0       PHQ-9  English  PHQ-9   Any Language  CHEYENNE-7  Suicide Assessment Five-step Evaluation and Treatment (SAFE-T)  Hypothyroidism Folloxw-up      Since last visit, patient describes the following symptoms: Weight stable, no hair loss, no skin changes, no constipation, no loose stools  Asthma Follow-Up    Was ACT completed today?    Yes    ACT Total Scores 5/8/2019   ACT TOTAL SCORE -   ASTHMA ER VISITS -   ASTHMA HOSPITALIZATIONS -   ACT TOTAL SCORE (Goal Greater than or Equal to 20) 25   In the past 12 months, how many times did you visit the emergency room for your asthma without being admitted to the hospital? 0   In the past 12 months, how many times were you hospitalized overnight because of your asthma? 0       Recent asthma triggers that patient is dealing with: None      Weaned off xanax recently   She seeing psych  Thyroid meds managed by family doc([facility])      progesterone and thyroid (t3/T4) meds  Magnesium    Migraines are better-2 in the last 6 months-she is taking imitrex  Allergy induced asthma-stable      Additional history: as  documented    Reviewed  and updated as needed this visit by clinical staff  Tobacco  Meds         Reviewed and updated as needed this visit by Provider         Patient Active Problem List   Diagnosis     Depression, major     Migraine headache     CHEYENNE (generalized anxiety disorder)     CARDIOVASCULAR SCREENING; LDL GOAL LESS THAN 160     Intermittent asthma     Vitamin D deficiency     Elevated LFTs     Hypertension goal BP (blood pressure) < 140/90     Encounter for IUD insertion     Zinc deficiency     IBS (irritable bowel syndrome)     Lactose intolerance     Nonallergic rhinitis     Diagnostic skin and sensitization tests     Keratosis pilaris     Other specified hypothyroidism     Fibromyalgia     Migraine with aura and without status migrainosus, not intractable     Iron deficiency anemia, unspecified iron deficiency anemia type     Hypothyroidism due to Hashimoto's thyroiditis     Past Surgical History:   Procedure Laterality Date     BIOPSY      Cervical and skin punch not sure on dates. done at [facility].     CRYOTHERAPY, CERVICAL         Social History     Tobacco Use     Smoking status: Former Smoker     Packs/day: 0.50     Years: 10.00     Pack years: 5.00     Types: Cigarettes     Last attempt to quit: 2002     Years since quittin.7     Smokeless tobacco: Former User   Substance Use Topics     Alcohol use: No     Family History   Problem Relation Age of Onset     Diabetes Maternal Grandfather      Cancer Maternal Grandfather         brain     Cardiovascular Maternal Grandmother         carotid stenosis, CHF     Hypertension Maternal Grandmother      Anxiety Disorder Maternal Grandmother      Thyroid Disease Maternal Grandmother      Lipids Brother      Allergies Brother      Hypertension Mother      Allergies Mother      Arthritis Mother      Other - See Comments Mother         Mangioma removed 2015     Anxiety Disorder Mother      Diabetes Father      C.A.D. Father         CABG,  "stents, 52     Heart Disease Father      Alcohol/Drug Paternal Grandfather      Thyroid Disease Maternal Aunt      Cancer Other 41        uterine      Other Cancer Paternal Grandmother         Lung and Basel cell skin     Cancer Maternal Aunt         ov     Cancer Other         maternal cousin     Diabetes Other      Other Cancer Other      Diabetes Other      Hypertension Other      Anxiety Disorder Other      Anxiety Disorder Cousin      Thyroid Disease Cousin            ROS:  Constitutional, HEENT, cardiovascular, pulmonary, GI, , musculoskeletal, neuro, skin, endocrine and psych systems are negative, except as otherwise noted.    OBJECTIVE:     /70   Pulse 84   Temp 97.9  F (36.6  C) (Oral)   Ht 1.626 m (5' 4\")   Wt 76.2 kg (168 lb)   BMI 28.84 kg/m    Body mass index is 28.84 kg/m .  GENERAL: healthy, alert and no distress  EYES: Eyes grossly normal to inspection, PERRL and conjunctivae and sclerae normal  HENT: ear canals and TM's normal, nose and mouth without ulcers or lesions  NECK: no adenopathy, no asymmetry, masses, or scars and thyroid normal to palpation  RESP: lungs clear to auscultation - no rales, rhonchi or wheezes  CV: regular rate and rhythm, normal S1 S2, no S3 or S4, no murmur, click or rub, no peripheral edema and peripheral pulses strong  ABDOMEN: soft, nontender, no hepatosplenomegaly, no masses and bowel sounds normal  MS: no gross musculoskeletal defects noted, no edema  SKIN: bull's eye rash on back of her arm  NEURO: Normal strength and tone, mentation intact and speech normal  PSYCH: mentation appears normal, affect normal/bright    Diagnostic Test Results:  none     ASSESSMENT/PLAN:       ICD-10-CM    1. Erythema migrans (Lyme disease) A69.20 Lyme Disease Sharyn with reflex to WB Serum     doxycycline hyclate (VIBRAMYCIN) 100 MG capsule     CANCELED: Lymes Antibodies Total ([facility])   2. Visit for screening mammogram Z12.31 MA Screening Digital Bilateral   3. Migraine with aura " and without status migrainosus, not intractable G43.109    4. Hypothyroidism due to Hashimoto's thyroiditis E03.8     E06.3    5. Hypertension goal BP (blood pressure) < 140/90 I10 Basic metabolic panel     Hemoglobin   6. Rash R21 Lyme Disease Sharyn with reflex to WB Serum     CANCELED: Lyme Confirm IgG by Immunoblot   erythema migrans-check labs, treat with doxy  Migraines/hypothyroidism-stable on med, sees naturopathic doc for thyroid issues  Anxiety-off her xanax, sees psych, no other meds now, she feels better after weaning off  bp-improved, on no meds  Lost weight on keto diet, she is feeling well, fasting labs in august  Close follow laurita  Follow up in august for physical and fasting labs  Declined any chronic med refills  See Patient Instructions    Aubrey Hsu,   [facility]

## 2019-05-08 NOTE — PATIENT INSTRUCTIONS
Take antibiotics  Close follow your rash  See you in august for physical and fasting labs  Aubrey Hsu D.O.

## 2019-05-09 LAB
ANION GAP SERPL CALCULATED.3IONS-SCNC: 9 MMOL/L (ref 3–14)
B BURGDOR IGG+IGM SER QL: 0.11 (ref 0–0.89)
BUN SERPL-MCNC: 18 MG/DL (ref 7–30)
CALCIUM SERPL-MCNC: 9 MG/DL (ref 8.5–10.1)
CHLORIDE SERPL-SCNC: 109 MMOL/L (ref 94–109)
CO2 SERPL-SCNC: 21 MMOL/L (ref 20–32)
CREAT SERPL-MCNC: 0.68 MG/DL (ref 0.52–1.04)
GFR SERPL CREATININE-BSD FRML MDRD: >90 ML/MIN/{1.73_M2}
GLUCOSE SERPL-MCNC: 90 MG/DL (ref 70–99)
POTASSIUM SERPL-SCNC: 3.9 MMOL/L (ref 3.4–5.3)
SODIUM SERPL-SCNC: 139 MMOL/L (ref 133–144)

## 2019-05-09 ASSESSMENT — ASTHMA QUESTIONNAIRES: ACT_TOTALSCORE: 25

## 2020-01-27 ENCOUNTER — TELEPHONE (OUTPATIENT)
Dept: FAMILY MEDICINE | Facility: CLINIC | Age: 45
End: 2020-01-27

## 2020-01-27 NOTE — LETTER
[facility]  [address] [address] [phone]                                                                                                January 27, 2020    Johnna Severson  [address] [address]        Dear Ms. Severson,    At [facility] we care about your health and well-being. A review of your chart has indicated that you are due for a(n) Pap and physical exam. Please contact us at [phone] to schedule your appointment.     If you have already had one or all of the above screening tests at another facility, please call us to update your chart.     You may contact the clinic at [phone] if you have any questions or concerns about this request.      Sincerely,      Your Care Team

## 2020-01-27 NOTE — TELEPHONE ENCOUNTER
Panel Management Review      Patient has the following on her problem list:     Hypertension   Last three blood pressure readings:  BP Readings from Last 3 Encounters:   05/08/19 122/70   11/16/18 124/76   08/07/18 130/86     Blood pressure: Passed    HTN Guidelines:  Less than 140/90      Composite cancer screening  Chart review shows that this patient is due/due soon for the following Pap Smear  Summary:    Patient is due/failing the following:   PHQ9 ACT and PHYSICAL    Action needed:   Patient needs office visit for Physical and PAP.    Type of outreach:    Sent letter.    Questions for provider review:    None                                                                                                                                    Haley Collier MA       Chart routed to Care Team .

## 2020-02-03 NOTE — TELEPHONE ENCOUNTER
Panel Management Review  Summary:    Type of outreach:    Phone, left message for patient to call back.     Encounter routed to No Action Needed.                                                                                                                               Haley Collier MA
